# Patient Record
Sex: MALE | Race: WHITE | NOT HISPANIC OR LATINO | Employment: FULL TIME | URBAN - METROPOLITAN AREA
[De-identification: names, ages, dates, MRNs, and addresses within clinical notes are randomized per-mention and may not be internally consistent; named-entity substitution may affect disease eponyms.]

---

## 2023-03-13 DIAGNOSIS — I10 ESSENTIAL (PRIMARY) HYPERTENSION: ICD-10-CM

## 2023-03-13 RX ORDER — TRIAMTERENE AND HYDROCHLOROTHIAZIDE 75; 50 MG/1; MG/1
TABLET ORAL
Qty: 90 TABLET | Refills: 0 | Status: SHIPPED | OUTPATIENT
Start: 2023-03-13 | End: 2023-06-18

## 2023-03-16 DIAGNOSIS — I10 ESSENTIAL (PRIMARY) HYPERTENSION: Primary | ICD-10-CM

## 2023-03-16 RX ORDER — AMLODIPINE BESYLATE 10 MG/1
TABLET ORAL
Qty: 90 TABLET | Refills: 0 | Status: SHIPPED | OUTPATIENT
Start: 2023-03-16 | End: 2023-06-18

## 2023-03-31 DIAGNOSIS — E78.00 PURE HYPERCHOLESTEROLEMIA, UNSPECIFIED: ICD-10-CM

## 2023-03-31 RX ORDER — ROSUVASTATIN CALCIUM 40 MG/1
TABLET, COATED ORAL
Qty: 90 TABLET | Refills: 0 | Status: SHIPPED | OUTPATIENT
Start: 2023-03-31 | End: 2023-06-22 | Stop reason: SDUPTHER

## 2023-06-17 DIAGNOSIS — I10 ESSENTIAL (PRIMARY) HYPERTENSION: ICD-10-CM

## 2023-06-18 RX ORDER — AMLODIPINE BESYLATE 10 MG/1
TABLET ORAL
Qty: 90 TABLET | Refills: 0 | Status: SHIPPED | OUTPATIENT
Start: 2023-06-18 | End: 2023-09-17

## 2023-06-18 RX ORDER — TRIAMTERENE AND HYDROCHLOROTHIAZIDE 75; 50 MG/1; MG/1
TABLET ORAL
Qty: 90 TABLET | Refills: 0 | Status: SHIPPED | OUTPATIENT
Start: 2023-06-18 | End: 2023-09-17

## 2023-06-22 DIAGNOSIS — E78.00 PURE HYPERCHOLESTEROLEMIA, UNSPECIFIED: ICD-10-CM

## 2023-06-22 RX ORDER — ROSUVASTATIN CALCIUM 40 MG/1
TABLET, COATED ORAL
Qty: 90 TABLET | Refills: 0 | Status: SHIPPED | OUTPATIENT
Start: 2023-06-22 | End: 2023-11-03 | Stop reason: SDUPTHER

## 2023-09-17 DIAGNOSIS — I10 ESSENTIAL (PRIMARY) HYPERTENSION: ICD-10-CM

## 2023-09-17 RX ORDER — AMLODIPINE BESYLATE 10 MG/1
TABLET ORAL
Qty: 30 TABLET | Refills: 0 | Status: SHIPPED | OUTPATIENT
Start: 2023-09-17 | End: 2023-11-03 | Stop reason: SDUPTHER

## 2023-09-17 RX ORDER — TRIAMTERENE AND HYDROCHLOROTHIAZIDE 75; 50 MG/1; MG/1
TABLET ORAL
Qty: 30 TABLET | Refills: 0 | Status: SHIPPED | OUTPATIENT
Start: 2023-09-17 | End: 2023-11-03 | Stop reason: ALTCHOICE

## 2023-11-02 PROBLEM — M51.36 DDD (DEGENERATIVE DISC DISEASE), LUMBAR: Status: ACTIVE | Noted: 2023-11-02

## 2023-11-02 PROBLEM — R93.1 ELEVATED CORONARY ARTERY CALCIUM SCORE: Status: ACTIVE | Noted: 2023-11-02

## 2023-11-02 PROBLEM — E66.9 OBESE: Status: ACTIVE | Noted: 2023-11-02

## 2023-11-02 PROBLEM — G62.9 NEUROPATHY: Status: ACTIVE | Noted: 2023-11-02

## 2023-11-02 PROBLEM — R06.2 WHEEZING: Status: ACTIVE | Noted: 2023-11-02

## 2023-11-02 PROBLEM — I10 HYPERTENSION: Status: ACTIVE | Noted: 2023-11-02

## 2023-11-02 PROBLEM — J11.1 INFLUENZA: Status: ACTIVE | Noted: 2023-11-02

## 2023-11-02 PROBLEM — E55.9 VITAMIN D DEFICIENCY: Status: ACTIVE | Noted: 2023-11-02

## 2023-11-02 PROBLEM — I71.9 ANEURYSM, AORTIC (CMS-HCC): Status: ACTIVE | Noted: 2023-11-02

## 2023-11-02 PROBLEM — M51.27 HERNIATED NUCLEUS PULPOSUS, L5-S1, LEFT: Status: ACTIVE | Noted: 2023-11-02

## 2023-11-02 PROBLEM — Q23.1 BICUSPID AORTIC VALVE (HHS-HCC): Status: ACTIVE | Noted: 2023-11-02

## 2023-11-02 PROBLEM — I35.0 AORTIC STENOSIS, MILD: Status: ACTIVE | Noted: 2023-11-02

## 2023-11-02 PROBLEM — M51.369 DDD (DEGENERATIVE DISC DISEASE), LUMBAR: Status: ACTIVE | Noted: 2023-11-02

## 2023-11-02 PROBLEM — I31.39 PERICARDIAL EFFUSION (HHS-HCC): Status: ACTIVE | Noted: 2023-11-02

## 2023-11-02 PROBLEM — H66.90 ACUTE OTITIS MEDIA: Status: ACTIVE | Noted: 2023-11-02

## 2023-11-02 PROBLEM — R07.89 ATYPICAL CHEST PAIN: Status: ACTIVE | Noted: 2023-11-02

## 2023-11-02 PROBLEM — Q23.81 AORTIC REGURGITATION DUE TO BICUSPID AORTIC VALVE: Status: ACTIVE | Noted: 2023-11-02

## 2023-11-02 PROBLEM — E78.00 HYPERCHOLESTEROLEMIA: Status: ACTIVE | Noted: 2023-11-02

## 2023-11-02 PROBLEM — M54.16 ACUTE LEFT LUMBAR RADICULOPATHY: Status: ACTIVE | Noted: 2023-11-02

## 2023-11-02 PROBLEM — R73.9 HYPERGLYCEMIA: Status: ACTIVE | Noted: 2023-11-02

## 2023-11-02 PROBLEM — Q23.1 AORTIC REGURGITATION DUE TO BICUSPID AORTIC VALVE (HHS-HCC): Status: ACTIVE | Noted: 2023-11-02

## 2023-11-02 PROBLEM — J01.00 ACUTE MAXILLARY SINUSITIS: Status: ACTIVE | Noted: 2023-11-02

## 2023-11-02 PROBLEM — F41.9 ANXIETY: Status: ACTIVE | Noted: 2023-11-02

## 2023-11-02 PROBLEM — F41.1 GAD (GENERALIZED ANXIETY DISORDER): Status: ACTIVE | Noted: 2023-11-02

## 2023-11-02 PROBLEM — L30.9 DERMATITIS: Status: ACTIVE | Noted: 2023-11-02

## 2023-11-02 PROBLEM — F43.21 ADJUSTMENT DISORDER WITH DEPRESSED MOOD: Status: ACTIVE | Noted: 2023-11-02

## 2023-11-02 PROBLEM — T78.40XA ALLERGIC REACTION: Status: ACTIVE | Noted: 2023-11-02

## 2023-11-02 PROBLEM — E87.6 HYPOKALEMIA: Status: ACTIVE | Noted: 2023-11-02

## 2023-11-02 PROBLEM — E56.9 VITAMIN DEFICIENCY: Status: ACTIVE | Noted: 2023-11-02

## 2023-11-02 PROBLEM — R06.09 DYSPNEA ON EXERTION: Status: ACTIVE | Noted: 2023-11-02

## 2023-11-02 PROBLEM — M79.605 LEFT LEG PAIN: Status: ACTIVE | Noted: 2023-11-02

## 2023-11-02 PROBLEM — E79.0 ASYMPTOMATIC HYPERURICEMIA: Status: ACTIVE | Noted: 2023-11-02

## 2023-11-02 PROBLEM — I71.21 ASCENDING AORTIC ANEURYSM (CMS-HCC): Status: ACTIVE | Noted: 2023-11-02

## 2023-11-02 PROBLEM — R20.2 LEFT LEG PARESTHESIAS: Status: ACTIVE | Noted: 2023-11-02

## 2023-11-02 PROBLEM — Q23.81 BICUSPID AORTIC VALVE: Status: ACTIVE | Noted: 2023-11-02

## 2023-11-02 RX ORDER — ACETAMINOPHEN 500 MG
TABLET ORAL
COMMUNITY
Start: 2014-04-28 | End: 2023-11-03 | Stop reason: ALTCHOICE

## 2023-11-02 RX ORDER — ALLOPURINOL 300 MG/1
1 TABLET ORAL DAILY
COMMUNITY
End: 2023-11-03 | Stop reason: ALTCHOICE

## 2023-11-02 RX ORDER — BUSPIRONE HYDROCHLORIDE 7.5 MG/1
1 TABLET ORAL 2 TIMES DAILY
COMMUNITY
Start: 2023-03-24 | End: 2023-11-03 | Stop reason: ALTCHOICE

## 2023-11-02 RX ORDER — AMOXICILLIN 500 MG/1
1 CAPSULE ORAL
COMMUNITY
Start: 2023-02-27 | End: 2023-11-03 | Stop reason: ALTCHOICE

## 2023-11-02 RX ORDER — ASPIRIN 81 MG/1
TABLET ORAL
COMMUNITY
Start: 2022-04-11

## 2023-11-02 RX ORDER — NIRMATRELVIR AND RITONAVIR 300-100 MG
KIT ORAL
COMMUNITY
Start: 2022-05-20 | End: 2023-11-03 | Stop reason: ALTCHOICE

## 2023-11-02 RX ORDER — BETAMETHASONE DIPROPIONATE 0.5 MG/G
OINTMENT TOPICAL
COMMUNITY
Start: 2020-03-10

## 2023-11-02 RX ORDER — POTASSIUM CHLORIDE 20 MEQ/1
2 TABLET, EXTENDED RELEASE ORAL DAILY
COMMUNITY
Start: 2022-05-15 | End: 2023-11-03 | Stop reason: ALTCHOICE

## 2023-11-02 RX ORDER — IBUPROFEN 800 MG/1
TABLET ORAL
COMMUNITY
Start: 2020-06-21

## 2023-11-02 RX ORDER — GABAPENTIN 300 MG/1
CAPSULE ORAL
COMMUNITY
End: 2023-11-03 | Stop reason: ALTCHOICE

## 2023-11-02 RX ORDER — TRAMADOL HYDROCHLORIDE 50 MG/1
TABLET ORAL
COMMUNITY
End: 2023-11-03 | Stop reason: ALTCHOICE

## 2023-11-02 RX ORDER — BUSPIRONE HYDROCHLORIDE 5 MG/1
1 TABLET ORAL 2 TIMES DAILY
COMMUNITY
Start: 2023-03-24 | End: 2023-11-03 | Stop reason: ALTCHOICE

## 2023-11-02 RX ORDER — LOSARTAN POTASSIUM 50 MG/1
50 TABLET ORAL DAILY
COMMUNITY
End: 2023-11-03 | Stop reason: SDUPTHER

## 2023-11-03 ENCOUNTER — OFFICE VISIT (OUTPATIENT)
Dept: CARDIOLOGY | Facility: CLINIC | Age: 48
End: 2023-11-03
Payer: COMMERCIAL

## 2023-11-03 VITALS
HEART RATE: 79 BPM | SYSTOLIC BLOOD PRESSURE: 156 MMHG | DIASTOLIC BLOOD PRESSURE: 72 MMHG | HEIGHT: 67 IN | BODY MASS INDEX: 40.43 KG/M2 | WEIGHT: 257.56 LBS | OXYGEN SATURATION: 93 %

## 2023-11-03 DIAGNOSIS — Q23.1 AORTIC REGURGITATION DUE TO BICUSPID AORTIC VALVE (HHS-HCC): ICD-10-CM

## 2023-11-03 DIAGNOSIS — I35.0 AORTIC VALVE STENOSIS, ETIOLOGY OF CARDIAC VALVE DISEASE UNSPECIFIED: ICD-10-CM

## 2023-11-03 DIAGNOSIS — I25.10 CORONARY ARTERY CALCIFICATION: Primary | ICD-10-CM

## 2023-11-03 DIAGNOSIS — I71.21 ANEURYSM OF ASCENDING AORTA WITHOUT RUPTURE (CMS-HCC): ICD-10-CM

## 2023-11-03 DIAGNOSIS — E78.00 PURE HYPERCHOLESTEROLEMIA, UNSPECIFIED: ICD-10-CM

## 2023-11-03 DIAGNOSIS — I35.0 AORTIC STENOSIS, MILD: ICD-10-CM

## 2023-11-03 DIAGNOSIS — I10 ESSENTIAL (PRIMARY) HYPERTENSION: ICD-10-CM

## 2023-11-03 DIAGNOSIS — I10 HYPERTENSION, UNSPECIFIED TYPE: ICD-10-CM

## 2023-11-03 DIAGNOSIS — E78.00 HYPERCHOLESTEROLEMIA: ICD-10-CM

## 2023-11-03 DIAGNOSIS — R93.1 ELEVATED CORONARY ARTERY CALCIUM SCORE: ICD-10-CM

## 2023-11-03 DIAGNOSIS — I25.84 CORONARY ARTERY CALCIFICATION: Primary | ICD-10-CM

## 2023-11-03 PROCEDURE — 3077F SYST BP >= 140 MM HG: CPT | Performed by: INTERNAL MEDICINE

## 2023-11-03 PROCEDURE — 3078F DIAST BP <80 MM HG: CPT | Performed by: INTERNAL MEDICINE

## 2023-11-03 PROCEDURE — 99214 OFFICE O/P EST MOD 30 MIN: CPT | Performed by: INTERNAL MEDICINE

## 2023-11-03 RX ORDER — LOSARTAN POTASSIUM 100 MG/1
100 TABLET ORAL DAILY
Qty: 90 TABLET | Refills: 3 | Status: SHIPPED | OUTPATIENT
Start: 2023-11-03 | End: 2024-11-02

## 2023-11-03 RX ORDER — AMLODIPINE BESYLATE 10 MG/1
10 TABLET ORAL DAILY
Qty: 90 TABLET | Refills: 3 | Status: SHIPPED | OUTPATIENT
Start: 2023-11-03 | End: 2024-11-02

## 2023-11-03 RX ORDER — ROSUVASTATIN CALCIUM 40 MG/1
40 TABLET, COATED ORAL DAILY
Qty: 90 TABLET | Refills: 3 | Status: SHIPPED | OUTPATIENT
Start: 2023-11-03 | End: 2024-11-02

## 2023-11-03 ASSESSMENT — ENCOUNTER SYMPTOMS
DIARRHEA: 0
FEVER: 0
ALTERED MENTAL STATUS: 0
WHEEZING: 0
ABDOMINAL PAIN: 0
CHILLS: 0
HEMOPTYSIS: 0
HEMATURIA: 0
NAUSEA: 0
HEADACHES: 0
DYSURIA: 0
DEPRESSION: 0
BLOATING: 0
MEMORY LOSS: 0
FALLS: 0
VOMITING: 0
MYALGIAS: 0
COUGH: 0
CONSTIPATION: 0

## 2023-11-03 ASSESSMENT — PAIN SCALES - GENERAL: PAINLEVEL: 0-NO PAIN

## 2023-11-03 NOTE — PATIENT INSTRUCTIONS
Increase Losartan from 50 to 100mg 1x/day (can take 50mg x2 until run out)    Goal BP <130/80    If too high, let me know to add another med ([phone])

## 2023-11-03 NOTE — PROGRESS NOTES
Chief complaint:  Follow-up     HPI  49 yo WM w/ h/o pericardial effusion (during viral pericarditis) s/p window 2/16, ?bicuspid AV w/ mild AS and mod AI, AsAo aneurysm, CAC, HTN, HLD, DM now here for cardiology FU. No chest pain. No dyspnea. No palps. No LH/dizziness/syncope. No edema. No claudication. No cough.   He walks 30 minutes 3d/wk with no symptoms.  BP at home: 150s  ECG 7/16: SR (74), 1st deg AVB, ?AMI, lat ST-T changes  ECG 1/19: SR (63), 1st deg AVB, ?AMI, lat ST-T changes  ECG 4/22: SR (66), 1st deg AVB, ?AMI, lat ST-T changes  Echo 2/15: EF 55%, AoR 4.5cm, mod dil AsAo, mild-mod AI, large PE (near RA/RV collapse)  Echo 2/16: nl LV/RV, mod-large PE (mod RA collapse)  Echo 10/16: EF 65%, mod LVH, DD, nl RV, ?bicuspid AV, mild AS, mod AI, small PE  Echo 5/22: TDS, EF 65%, DD, AoR 4.7cm, AsAo 4.6cm, mild AVS, mod AI, trivial PE  CAC 4/22: 490 (LM 0, , Lcx 31, RCA 28), CM, small-mod PE, AsAo 4.8cm  CT chest 4/16: CM, peric eff, nl Ao, no aneurysm, nl PA  CT chest 4/17: mild CM mod peric eff (stable), AsAo 4.5cm, nl PA  CT ab 2/16: CM, large PE, no aneurysm     Review of Systems   Constitutional: Negative for chills, fever and malaise/fatigue.   HENT:  Negative for hearing loss.    Eyes:  Negative for visual disturbance.   Respiratory:  Negative for cough, hemoptysis and wheezing.    Skin:  Negative for rash.   Musculoskeletal:  Negative for falls and myalgias.   Gastrointestinal:  Negative for bloating, abdominal pain, constipation, diarrhea, dysphagia, nausea and vomiting.   Genitourinary:  Negative for dysuria and hematuria.   Neurological:  Negative for headaches.   Psychiatric/Behavioral:  Negative for altered mental status, depression and memory loss.         Social History     Tobacco Use    Smoking status: Never    Smokeless tobacco: Never   Substance Use Topics    Alcohol use: Not Currently        Family History   Problem Relation Name Age of Onset    Breast cancer Mother      Heart attack  Mother      Heart attack Father      Alcohol abuse Maternal Grandfather          Allergies   Allergen Reactions    Lipofen [Fenofibrate] Other        Current Outpatient Medications   Medication Instructions    amLODIPine (Norvasc) 10 mg tablet TAKE 1 TABLET BY MOUTH EVERY DAY    aspirin 81 mg EC tablet TAKE 1 TABLET Daily take 1 tablet daily with food    betamethasone dipropionate (Diprosone) 0.05 % ointment APPLY SPARINGLY TO AFFECTED AREA(S) ONCE DAILY    ibuprofen 800 mg tablet TAKE 1 TABLET EVERY 8 HOURS WITH FOOD AS NEEDED.    losartan (COZAAR) 50 mg, oral, Daily, as directed    rosuvastatin (Crestor) 40 mg tablet TAKE 1 TABLET BY MOUTH EVERY DAY    triamterene-hydrochlorothiazid (Maxzide) 75-50 mg tablet TAKE 1 TABLET BY MOUTH EVERY DAY        Vitals:    11/03/23 1505   Pulse: 88   SpO2: 93%        Physical Exam  Constitutional:       Appearance: Normal appearance.   HENT:      Head: Normocephalic and atraumatic.      Nose: Nose normal.   Cardiovascular:      Rate and Rhythm: Normal rate and regular rhythm.      Heart sounds: Murmur heard.      Systolic murmur is present with a grade of 1/6.      Diastolic murmur is present with a grade of 1/4.   Pulmonary:      Effort: Pulmonary effort is normal.      Breath sounds: Normal breath sounds.   Abdominal:      Palpations: Abdomen is soft.      Tenderness: There is no abdominal tenderness.   Musculoskeletal:      Right lower leg: No edema.      Left lower leg: No edema.   Skin:     General: Skin is warm and dry.   Neurological:      General: No focal deficit present.      Mental Status: He is alert.   Psychiatric:         Mood and Affect: Mood normal.         Judgment: Judgment normal.       Labs  11/21 Cr 0.88, K 3.0, LFT nl, LDL 54, HDL 32, , Chol 121, HGB 15.8, , hgba1c 7.7, TSH 2.03     Assessment/Plan   47 yo WM w/ h/o pericardial effusion (during viral pericarditis) s/p window 2/16, ?bicuspid AV w/ mild AS and mod AI, AsAo aneurysm, CAC, HTN,  HLD, DM. Doing well. No sig cardiac symptoms. Due to AsAo aneurysm (and BAV/AVS/AI), check echo (likely will need annual or CT).   BP needs better control (SBP may be higher with AI).  -increase Losartan from 50 to 100 qd   -continue Amlodipine 10 every day  -plan resume Triam-hydrochlorothiazide if needed for HTN  -continue Rosvua 40 every day  -recommend updated labs if not recently done  -f/u 1 year (earlier if needed)    Joe Bailey MD

## 2023-11-14 ENCOUNTER — APPOINTMENT (OUTPATIENT)
Dept: CARDIOLOGY | Facility: CLINIC | Age: 48
End: 2023-11-14
Payer: COMMERCIAL

## 2023-11-27 ENCOUNTER — HOSPITAL ENCOUNTER (OUTPATIENT)
Dept: CARDIOLOGY | Facility: CLINIC | Age: 48
Discharge: HOME | End: 2023-11-27
Payer: COMMERCIAL

## 2023-11-27 DIAGNOSIS — I35.1 NONRHEUMATIC AORTIC (VALVE) INSUFFICIENCY: ICD-10-CM

## 2023-11-27 DIAGNOSIS — I71.20 THORACIC AORTIC ANEURYSM, WITHOUT RUPTURE, UNSPECIFIED (CMS-HCC): ICD-10-CM

## 2023-11-27 DIAGNOSIS — Q23.1 AORTIC REGURGITATION DUE TO BICUSPID AORTIC VALVE (HHS-HCC): ICD-10-CM

## 2023-11-27 DIAGNOSIS — I71.21 ANEURYSM OF ASCENDING AORTA WITHOUT RUPTURE (CMS-HCC): ICD-10-CM

## 2023-11-27 DIAGNOSIS — I35.0 AORTIC VALVE STENOSIS, ETIOLOGY OF CARDIAC VALVE DISEASE UNSPECIFIED: ICD-10-CM

## 2023-11-27 PROCEDURE — 93321 DOPPLER ECHO F-UP/LMTD STD: CPT

## 2023-11-27 PROCEDURE — 93321 DOPPLER ECHO F-UP/LMTD STD: CPT | Performed by: INTERNAL MEDICINE

## 2023-11-27 PROCEDURE — 93325 DOPPLER ECHO COLOR FLOW MAPG: CPT | Performed by: INTERNAL MEDICINE

## 2023-11-27 PROCEDURE — 93308 TTE F-UP OR LMTD: CPT | Performed by: INTERNAL MEDICINE

## 2023-11-29 LAB
AORTIC VALVE MEAN GRADIENT: 21
AORTIC VALVE PEAK VELOCITY: 3
AV PEAK GRADIENT: 36
AVA (PEAK VEL): 2.23
AVA (VTI): 2.26
EJECTION FRACTION APICAL 4 CHAMBER: 59.9
EJECTION FRACTION: 59
LEFT VENTRICLE INTERNAL DIMENSION DIASTOLE: 6 (ref 3.5–6)
LEFT VENTRICULAR OUTFLOW TRACT DIAMETER: 2.5
RIGHT VENTRICLE PEAK SYSTOLIC PRESSURE: 28.2

## 2024-02-12 DIAGNOSIS — I10 HYPERTENSION, UNSPECIFIED TYPE: Primary | ICD-10-CM

## 2024-02-12 RX ORDER — TRIAMTERENE AND HYDROCHLOROTHIAZIDE 75; 50 MG/1; MG/1
1 TABLET ORAL DAILY
Qty: 90 TABLET | Refills: 1 | Status: SHIPPED | OUTPATIENT
Start: 2024-02-12 | End: 2024-04-10 | Stop reason: SDUPTHER

## 2024-04-03 DIAGNOSIS — I10 HYPERTENSION, UNSPECIFIED TYPE: Primary | ICD-10-CM

## 2024-04-03 RX ORDER — CARVEDILOL 3.12 MG/1
3.12 TABLET ORAL
Qty: 180 TABLET | Refills: 3 | Status: SHIPPED | OUTPATIENT
Start: 2024-04-03 | End: 2024-04-10 | Stop reason: SDUPTHER

## 2024-04-10 RX ORDER — TRIAMTERENE AND HYDROCHLOROTHIAZIDE 75; 50 MG/1; MG/1
1 TABLET ORAL DAILY
Qty: 90 TABLET | Refills: 1 | Status: SHIPPED | OUTPATIENT
Start: 2024-04-10 | End: 2024-04-22 | Stop reason: SDUPTHER

## 2024-04-10 RX ORDER — CARVEDILOL 6.25 MG/1
6.25 TABLET ORAL
Qty: 180 TABLET | Refills: 1 | Status: SHIPPED | OUTPATIENT
Start: 2024-04-10 | End: 2025-04-10

## 2024-04-22 DIAGNOSIS — I10 HYPERTENSION, UNSPECIFIED TYPE: ICD-10-CM

## 2024-04-22 RX ORDER — TRIAMTERENE AND HYDROCHLOROTHIAZIDE 75; 50 MG/1; MG/1
1 TABLET ORAL DAILY
Qty: 90 TABLET | Refills: 1 | Status: SHIPPED | OUTPATIENT
Start: 2024-04-22 | End: 2025-04-22

## 2024-04-23 ENCOUNTER — OFFICE VISIT (OUTPATIENT)
Dept: PRIMARY CARE | Facility: CLINIC | Age: 49
End: 2024-04-23
Payer: COMMERCIAL

## 2024-04-23 VITALS
OXYGEN SATURATION: 96 % | SYSTOLIC BLOOD PRESSURE: 132 MMHG | BODY MASS INDEX: 40.74 KG/M2 | DIASTOLIC BLOOD PRESSURE: 56 MMHG | WEIGHT: 259.6 LBS | RESPIRATION RATE: 16 BRPM | HEIGHT: 67 IN | HEART RATE: 75 BPM

## 2024-04-23 DIAGNOSIS — E66.1 CLASS 3 DRUG-INDUCED OBESITY WITH SERIOUS COMORBIDITY AND BODY MASS INDEX (BMI) OF 40.0 TO 44.9 IN ADULT (MULTI): ICD-10-CM

## 2024-04-23 DIAGNOSIS — I10 HYPERTENSION, UNSPECIFIED TYPE: Primary | ICD-10-CM

## 2024-04-23 DIAGNOSIS — Q23.1 AORTIC REGURGITATION DUE TO BICUSPID AORTIC VALVE (HHS-HCC): ICD-10-CM

## 2024-04-23 DIAGNOSIS — E78.00 HYPERCHOLESTEROLEMIA: ICD-10-CM

## 2024-04-23 DIAGNOSIS — Z12.11 COLON CANCER SCREENING: ICD-10-CM

## 2024-04-23 PROCEDURE — 3078F DIAST BP <80 MM HG: CPT | Performed by: STUDENT IN AN ORGANIZED HEALTH CARE EDUCATION/TRAINING PROGRAM

## 2024-04-23 PROCEDURE — 3075F SYST BP GE 130 - 139MM HG: CPT | Performed by: STUDENT IN AN ORGANIZED HEALTH CARE EDUCATION/TRAINING PROGRAM

## 2024-04-23 PROCEDURE — 99214 OFFICE O/P EST MOD 30 MIN: CPT | Performed by: STUDENT IN AN ORGANIZED HEALTH CARE EDUCATION/TRAINING PROGRAM

## 2024-04-23 PROCEDURE — 3008F BODY MASS INDEX DOCD: CPT | Performed by: STUDENT IN AN ORGANIZED HEALTH CARE EDUCATION/TRAINING PROGRAM

## 2024-04-23 ASSESSMENT — PATIENT HEALTH QUESTIONNAIRE - PHQ9
1. LITTLE INTEREST OR PLEASURE IN DOING THINGS: NOT AT ALL
2. FEELING DOWN, DEPRESSED OR HOPELESS: NOT AT ALL
SUM OF ALL RESPONSES TO PHQ9 QUESTIONS 1 AND 2: 0

## 2024-04-23 NOTE — PROGRESS NOTES
History Of Present Illness  Michael Mulligan is a 48 y.o. male presents to the office for establishment of care.     HTN  Cardiology following   Continue rosuvastatin 40 mg daily   Recently increased triamterene-hydrochlorothiazide 75-50mg daily   Continue losartan 100 mg daily   Cardiac CT: 489.7   Echo 11/23    Anxiety   Improved and now off of medication  Buspar 7.5mg BID      Thoracic aorta   Aneurysmal dilatation ascending thoracic aorta up to 4.8 cm  similar to prior.    Past Medical History  He has a past medical history of Generalized anxiety disorder, Personal history of other mental and behavioral disorders, and Pure hypercholesterolemia, unspecified (12/14/2022).  s/p window 2/16, ?bicuspid AV w/ mild AS and mod AI, AsAo aneurysm, CAC, HTN, HLD, DM now here for cardiology FU     Surgical History  He has a past surgical history that includes Other surgical history (03/30/2016).     Social History  He reports that he has never smoked. He has never used smokeless tobacco. He reports that he does not currently use alcohol. He reports that he does not use drugs.    Family History  Family History   Problem Relation Name Age of Onset    Breast cancer Mother      Heart attack Mother      Heart attack Father      Alcohol abuse Maternal Grandfather          Allergies  Cat's claw and Lipofen [fenofibrate]       Physical Exam  Vitals reviewed.   Constitutional:       General: He is not in acute distress.     Appearance: He is obese. He is not ill-appearing.   HENT:      Right Ear: Tympanic membrane and ear canal normal.      Left Ear: Tympanic membrane and ear canal normal.      Mouth/Throat:      Mouth: Mucous membranes are moist.      Pharynx: Oropharynx is clear. No oropharyngeal exudate or posterior oropharyngeal erythema.   Eyes:      Extraocular Movements: Extraocular movements intact.      Conjunctiva/sclera: Conjunctivae normal.      Pupils: Pupils are equal, round, and reactive to light.   Neck:       Vascular: No carotid bruit.   Cardiovascular:      Rate and Rhythm: Normal rate and regular rhythm.      Heart sounds: No murmur heard.     No gallop.   Pulmonary:      Effort: Pulmonary effort is normal.      Breath sounds: Normal breath sounds. No wheezing, rhonchi or rales.   Abdominal:      General: Abdomen is flat. Bowel sounds are normal.      Palpations: Abdomen is soft.      Tenderness: There is no abdominal tenderness.   Musculoskeletal:      Cervical back: Neck supple.      Left lower leg: No edema.   Skin:     General: Skin is warm and dry.      Findings: No rash.   Neurological:      General: No focal deficit present.      Mental Status: He is alert and oriented to person, place, and time.      Gait: Gait normal.   Psychiatric:         Mood and Affect: Mood normal.         Behavior: Behavior normal.        Last Recorded Vitals  /56   Pulse 75   Resp 16   Wt 118 kg (259 lb 9.6 oz)   SpO2 96%     Relevant Results    Current Outpatient Medications:     amLODIPine (Norvasc) 10 mg tablet, Take 1 tablet (10 mg) by mouth once daily., Disp: 90 tablet, Rfl: 3    aspirin 81 mg EC tablet, TAKE 1 TABLET Daily take 1 tablet daily with food, Disp: , Rfl:     betamethasone dipropionate (Diprosone) 0.05 % ointment, APPLY SPARINGLY TO AFFECTED AREA(S) ONCE DAILY, Disp: , Rfl:     carvedilol (Coreg) 6.25 mg tablet, Take 1 tablet (6.25 mg) by mouth 2 times a day with meals., Disp: 180 tablet, Rfl: 1    ibuprofen 800 mg tablet, TAKE 1 TABLET EVERY 8 HOURS WITH FOOD AS NEEDED., Disp: , Rfl:     losartan (Cozaar) 100 mg tablet, Take 1 tablet (100 mg) by mouth once daily. as directed, Disp: 90 tablet, Rfl: 3    rosuvastatin (Crestor) 40 mg tablet, Take 1 tablet (40 mg) by mouth once daily., Disp: 90 tablet, Rfl: 3    triamterene-hydrochlorothiazid (Maxzide) 75-50 mg tablet, Take 1 tablet by mouth once daily., Disp: 90 tablet, Rfl: 1          Assessment/Plan   Diagnoses and all orders for this visit:  Hypertension,  unspecified type  -     CBC and Auto Differential; Future  -     Comprehensive metabolic panel; Future  -     Lipid Panel; Future  -     Tsh With Reflex To Free T4 If Abnormal; Future  -     Vascular US renal artery duplex complete; Future  -     Albumin , Urine Random; Future  Hypercholesterolemia  -     Lipid Panel; Future  Class 3 drug-induced obesity with serious comorbidity and body mass index (BMI) of 40.0 to 44.9 in adult (Multi)  -     Hemoglobin A1C; Future  -     Vitamin D 25-Hydroxy,Total (for eval of Vitamin D levels); Future  Colon cancer screening  -     Cologuard® colon cancer screening; Future    HTN  Continue losartan 100mg daily   Continue carvedilol 6.25mg daily   Continue amlodipine 10mg daily   Continue trimterene-hydrochlorothiazide 75-50mg daily   Physical exam was stable. Discussed maintaining diets such as DASH to help maintain normal Blood pressure. Encouraged regular exercise for a total of 120 min weekly. We will fu labs in 1-3 days. For now there will be no change in medical management. All questions and concerns were addressed. Pt will follow up in 4-6 months or sooner if needed.     DSL  Continue rosuvastatin 40mg daily   Lipid panel ordered     Heart Murmur   Aortic regurgitation due to bicuspid valve  Cardiology following     Anxiety   Improved and now off of medication  Buspar 7.5mg BID      Thoracic aorta   Stable   Aneurysmal dilatation ascending thoracic aorta up to 4.8 cm  similar to prior.  Cardiology following     Health Maintenance  Colon cancer screening cologaurd ordered   Health Maintenance   Topic Date Due    Yearly Adult Physical  Never done    HIV Screening  Never done    Colorectal Cancer Screening  Never done    Pneumococcal Vaccine: Pediatrics (0 to 5 Years) and At-Risk Patients (6 to 64 Years) (1 of 2 - PCV) Never done    Hepatitis C Screening  Never done    Hepatitis B Vaccines (1 of 3 - 19+ 3-dose series) Never done    DTaP/Tdap/Td Vaccines (1 - Tdap) Never done     MMR Vaccines (1 of 1 - Standard series) Never done    COVID-19 Vaccine (4 - 2023-24 season) 09/01/2023    Zoster Vaccines (1 of 2) 08/28/2024    Influenza Vaccine (Season Ended) 09/01/2024    Diabetes Screening  11/22/2024    Lipid Panel  11/22/2026    HIB Vaccines  Aged Out    IPV Vaccines  Aged Out    Hepatitis A Vaccines  Aged Out    Meningococcal Vaccine  Aged Out    Rotavirus Vaccines  Aged Out    HPV Vaccines  Aged Out            Amna White, DO

## 2024-08-07 ENCOUNTER — OFFICE VISIT (OUTPATIENT)
Dept: PAIN MEDICINE | Facility: CLINIC | Age: 49
End: 2024-08-07
Payer: COMMERCIAL

## 2024-08-07 DIAGNOSIS — M51.36 DDD (DEGENERATIVE DISC DISEASE), LUMBAR: Primary | ICD-10-CM

## 2024-08-07 DIAGNOSIS — M54.16 RIGHT LUMBAR RADICULITIS: ICD-10-CM

## 2024-08-07 PROCEDURE — 99214 OFFICE O/P EST MOD 30 MIN: CPT | Performed by: PHYSICAL MEDICINE & REHABILITATION

## 2024-08-07 RX ORDER — CYCLOBENZAPRINE HCL 5 MG
5 TABLET ORAL 2 TIMES DAILY PRN
Qty: 60 TABLET | Refills: 2 | Status: SHIPPED | OUTPATIENT
Start: 2024-08-07 | End: 2024-09-06

## 2024-08-07 NOTE — PROGRESS NOTES
Chief complaint  Back and lower limb pain  on the R     History  Michael Mulligan is back for pain management office visit  Pain after lifting while moving  The pain is interfering with activities of daily living, quality of life and quality of sleep. It is limiting the functions and everything takes longer to complete because of the slowing related to the pain. Movements are cautious to avoid aggravation of the symptoms.   Tried PT and Hep but not much relief   Pain started about 4 weeks and not remittant  The pain in the back is deep achy worse in the mid back area.  This is associated with tight muscle bands.  This limits the range of motion of the lumbar spine mainly in forward flexion.  The pain in the back is radiating around the side on the lateral aspect of the right thigh going down toward the lateral aspect of the right leg into the lateral malleolus toward the lateral aspect of the foot towards the big toe.    This pain down to the right lower limb is more of a burning tingling sensation.  The pain in the right lower limb worsens with bending forward or with any lifting, it improves with laying on the side and resting.  This is similar to the associated pain in the middle to lower back.  Denied any bowel or bladder incontinence.  With the worst pain there is tendency to catch the toe especially on carpeted area.  This occurs mostly when tired and toward the end of the day.    The skin is intact with no breakdown.  No vesicles.       Pain level at rest  is 4/10 , with the aggravation ,  pain level 6/10.          Review of Systems :  Denied any fever or chills. No weight loss and no night sweats. No cough or sputum production. No diarrhea   The constipation has been responding to fibers and over the counter medications.     No bladder and bowel incontinence and no other changes in bladder and bowel. No skin changes.  Reports tiredness and fatigability only if the pain is not controlled.             Physical examination  Awake, alert and oriented for time place and persons   declined Chaperone for the visit and was adequately  draped for the exam.    Examination of the lumbar spine showed tight muscle bands in the mid and lower back area, this is more pronounced on the right compared to the left.  Additionally, this is inducing mild reversal of the lumbar lordosis with functional scoliosis with right-sided concavity.  This scoliosis corrected with  bending of the lumbar spine.     Straight leg raising increased the pain in the back and down the lateral aspect of the right knee onto the lateral leg to the lateral malleolus and foot.     There is decreased sensory to light touch on the lateral aspect of the thigh and around the right knee going down to the lateral aspect of the leg to the right lateral malleolus into the dorsum of the foot..    Deep tendon reflexes is present for the patellar tendons bilaterally.  Achilles reflexes are present bilaterally and symmetric.    Medial Hamstrings reflex is decreased on the right compared to the left side.  Plantar cutaneous are downgoing.  Ankle dorsiflexion is 5/5 bilaterally.  Plantar flexion of the ankles are 5/5 bilaterally.  Big toe extension is 4/5 on the right compared to 5/5 on the left side.    Negative Tinel's sign over the right peroneal nerve at the fibular neck.  Fely sign for axial loading and global rotation are negative.  No aberrant pain behavior.           Diagnosis  Problem List Items Addressed This Visit       Right lumbar radiculitis    Relevant Medications    cyclobenzaprine (Flexeril) 5 mg tablet    Other Relevant Orders    FL pain management    Transforaminal    DDD (degenerative disc disease), lumbar - Primary    Relevant Medications    cyclobenzaprine (Flexeril) 5 mg tablet    Other Relevant Orders    FL pain management    Transforaminal        Plan  Reviewed the pain generators.  Went over the types of pain with neuropathic and  nociceptive and different pathologies and therapeutic modalities. Discussed the mechanism of action of interventions from acupuncture, physical therapy , regular exercises, injections, botox, spinal cord stimulation, and role of surgery     Went over pathology of the intervertebral disc displacement and the anatomical relation to the Nerve roots and relation to the radicular symptoms. Went over treatment modalities with conservative treatment including acupuncture   and epidural steroid injection with fluoroscopy guidance and last resort of surgery    Since he is having limitation with pain and tried conservative treatment, will consider LOKESH.  Risks not limited to infection, sepsis, abscess, bleeding , nerve injury, paralysis, and death...  Rl 5 Transforaminal epidural steroid injection      Continue with HEP        Discussed about NSAIDS and I explained about the opioids sparing effect to allow keeping the opioids dose at minimal effective dose.   I went over the potential side effects of the NSAIDS on the gastrointestinal, renal and cardiovascular systems.      I detailed the side effects from the acetaminophen in the medication and made aware of those. I also explained about the cumulative effects on the organs and mainly the liver.        Follow-up after the LOKESH  or earlier if needed     The level of clinical decision making in this office visit,  is high, given the high risks of complications with the morbidity and mortality due to the fact that acute and chronic pain may pose a threat to life and bodily function, if under treated, poorly treated, or with failure to maintain adequate treatment and timely medical follow up. Additionally over treatment has its own set of complications including overdosing on the pain medications and also the habit forming potentials with the use of the medications used to treat chronic painful conditions including therapeutic classes classified as dangerous medications. Given the  serious and fluctuating nature of pain level and instensity with extensive consideration for whenever pain changes, there is always the risk of prolonged functional impairment requiring close patient monitoring with regular assessments and reassessments and high level medical decision making at every office visit. The amount and complexity of data reviewed is high given the patient clinical presentation, labs,  data, radiology reports, and other tests as discussed during office visits. Pertinent data whether positive or negative were taken in consideration in the process of making this high level medical decision.

## 2024-09-06 ENCOUNTER — LAB (OUTPATIENT)
Dept: LAB | Facility: LAB | Age: 49
End: 2024-09-06
Payer: COMMERCIAL

## 2024-09-06 DIAGNOSIS — I10 HYPERTENSION, UNSPECIFIED TYPE: ICD-10-CM

## 2024-09-06 DIAGNOSIS — E66.1 CLASS 3 DRUG-INDUCED OBESITY WITH SERIOUS COMORBIDITY AND BODY MASS INDEX (BMI) OF 40.0 TO 44.9 IN ADULT (MULTI): ICD-10-CM

## 2024-09-06 DIAGNOSIS — E78.00 HYPERCHOLESTEROLEMIA: ICD-10-CM

## 2024-09-06 LAB
25(OH)D3 SERPL-MCNC: 22 NG/ML (ref 30–100)
ALBUMIN SERPL BCP-MCNC: 4.3 G/DL (ref 3.4–5)
ALP SERPL-CCNC: 60 U/L (ref 33–120)
ALT SERPL W P-5'-P-CCNC: 31 U/L (ref 10–52)
ANION GAP SERPL CALC-SCNC: 13 MMOL/L (ref 10–20)
AST SERPL W P-5'-P-CCNC: 21 U/L (ref 9–39)
BASOPHILS # BLD AUTO: 0.04 X10*3/UL (ref 0–0.1)
BASOPHILS NFR BLD AUTO: 0.7 %
BILIRUB SERPL-MCNC: 0.6 MG/DL (ref 0–1.2)
BUN SERPL-MCNC: 18 MG/DL (ref 6–23)
CALCIUM SERPL-MCNC: 9.3 MG/DL (ref 8.6–10.3)
CHLORIDE SERPL-SCNC: 98 MMOL/L (ref 98–107)
CHOLEST SERPL-MCNC: 126 MG/DL (ref 0–199)
CHOLESTEROL/HDL RATIO: 3.7
CO2 SERPL-SCNC: 30 MMOL/L (ref 21–32)
CREAT SERPL-MCNC: 0.93 MG/DL (ref 0.5–1.3)
CREAT UR-MCNC: 154 MG/DL (ref 20–370)
EGFRCR SERPLBLD CKD-EPI 2021: >90 ML/MIN/1.73M*2
EOSINOPHIL # BLD AUTO: 0.06 X10*3/UL (ref 0–0.7)
EOSINOPHIL NFR BLD AUTO: 1 %
ERYTHROCYTE [DISTWIDTH] IN BLOOD BY AUTOMATED COUNT: 13.1 % (ref 11.5–14.5)
EST. AVERAGE GLUCOSE BLD GHB EST-MCNC: 232 MG/DL
GLUCOSE SERPL-MCNC: 251 MG/DL (ref 74–99)
HBA1C MFR BLD: 9.7 %
HCT VFR BLD AUTO: 41.8 % (ref 41–52)
HDLC SERPL-MCNC: 33.8 MG/DL
HGB BLD-MCNC: 14.8 G/DL (ref 13.5–17.5)
IMM GRANULOCYTES # BLD AUTO: 0.02 X10*3/UL (ref 0–0.7)
IMM GRANULOCYTES NFR BLD AUTO: 0.3 % (ref 0–0.9)
LDLC SERPL CALC-MCNC: 42 MG/DL
LYMPHOCYTES # BLD AUTO: 1.69 X10*3/UL (ref 1.2–4.8)
LYMPHOCYTES NFR BLD AUTO: 29.5 %
MCH RBC QN AUTO: 29 PG (ref 26–34)
MCHC RBC AUTO-ENTMCNC: 35.4 G/DL (ref 32–36)
MCV RBC AUTO: 82 FL (ref 80–100)
MICROALBUMIN UR-MCNC: 20.9 MG/L
MICROALBUMIN/CREAT UR: 13.6 UG/MG CREAT
MONOCYTES # BLD AUTO: 0.67 X10*3/UL (ref 0.1–1)
MONOCYTES NFR BLD AUTO: 11.7 %
NEUTROPHILS # BLD AUTO: 3.24 X10*3/UL (ref 1.2–7.7)
NEUTROPHILS NFR BLD AUTO: 56.8 %
NON HDL CHOLESTEROL: 92 MG/DL (ref 0–149)
NRBC BLD-RTO: 0 /100 WBCS (ref 0–0)
PLATELET # BLD AUTO: 215 X10*3/UL (ref 150–450)
POTASSIUM SERPL-SCNC: 3.1 MMOL/L (ref 3.5–5.3)
PROT SERPL-MCNC: 7.2 G/DL (ref 6.4–8.2)
RBC # BLD AUTO: 5.11 X10*6/UL (ref 4.5–5.9)
SODIUM SERPL-SCNC: 138 MMOL/L (ref 136–145)
TRIGL SERPL-MCNC: 253 MG/DL (ref 0–149)
TSH SERPL-ACNC: 1.63 MIU/L (ref 0.44–3.98)
VLDL: 51 MG/DL (ref 0–40)
WBC # BLD AUTO: 5.7 X10*3/UL (ref 4.4–11.3)

## 2024-09-06 PROCEDURE — 82043 UR ALBUMIN QUANTITATIVE: CPT

## 2024-09-06 PROCEDURE — 36415 COLL VENOUS BLD VENIPUNCTURE: CPT

## 2024-09-06 PROCEDURE — 82306 VITAMIN D 25 HYDROXY: CPT

## 2024-09-06 PROCEDURE — 83036 HEMOGLOBIN GLYCOSYLATED A1C: CPT

## 2024-09-06 PROCEDURE — 82570 ASSAY OF URINE CREATININE: CPT

## 2024-09-09 ENCOUNTER — APPOINTMENT (OUTPATIENT)
Dept: PRIMARY CARE | Facility: CLINIC | Age: 49
End: 2024-09-09
Payer: COMMERCIAL

## 2024-09-09 VITALS
OXYGEN SATURATION: 95 % | HEIGHT: 67 IN | WEIGHT: 259.4 LBS | HEART RATE: 84 BPM | BODY MASS INDEX: 40.71 KG/M2 | DIASTOLIC BLOOD PRESSURE: 64 MMHG | SYSTOLIC BLOOD PRESSURE: 128 MMHG

## 2024-09-09 DIAGNOSIS — E11.9 TYPE 2 DIABETES MELLITUS WITHOUT COMPLICATION, WITHOUT LONG-TERM CURRENT USE OF INSULIN (MULTI): Primary | ICD-10-CM

## 2024-09-09 DIAGNOSIS — E55.9 VITAMIN D DEFICIENCY: ICD-10-CM

## 2024-09-09 DIAGNOSIS — E78.00 HYPERCHOLESTEROLEMIA: ICD-10-CM

## 2024-09-09 DIAGNOSIS — I10 HYPERTENSION, UNSPECIFIED TYPE: ICD-10-CM

## 2024-09-09 DIAGNOSIS — E11.9 TYPE 2 DIABETES MELLITUS WITHOUT COMPLICATION, WITHOUT LONG-TERM CURRENT USE OF INSULIN (MULTI): ICD-10-CM

## 2024-09-09 PROBLEM — R06.2 WHEEZING: Status: RESOLVED | Noted: 2023-11-02 | Resolved: 2024-09-09

## 2024-09-09 PROBLEM — R06.09 DYSPNEA ON EXERTION: Status: RESOLVED | Noted: 2023-11-02 | Resolved: 2024-09-09

## 2024-09-09 PROBLEM — R07.89 ATYPICAL CHEST PAIN: Status: RESOLVED | Noted: 2023-11-02 | Resolved: 2024-09-09

## 2024-09-09 PROBLEM — M79.605 LEFT LEG PAIN: Status: RESOLVED | Noted: 2023-11-02 | Resolved: 2024-09-09

## 2024-09-09 PROBLEM — J11.1 INFLUENZA: Status: RESOLVED | Noted: 2023-11-02 | Resolved: 2024-09-09

## 2024-09-09 PROBLEM — H66.90 ACUTE OTITIS MEDIA: Status: RESOLVED | Noted: 2023-11-02 | Resolved: 2024-09-09

## 2024-09-09 PROBLEM — I31.39 PERICARDIAL EFFUSION (HHS-HCC): Status: RESOLVED | Noted: 2023-11-02 | Resolved: 2024-09-09

## 2024-09-09 PROBLEM — R73.9 HYPERGLYCEMIA: Status: RESOLVED | Noted: 2023-11-02 | Resolved: 2024-09-09

## 2024-09-09 PROBLEM — E87.6 HYPOKALEMIA: Status: RESOLVED | Noted: 2023-11-02 | Resolved: 2024-09-09

## 2024-09-09 PROBLEM — J01.00 ACUTE MAXILLARY SINUSITIS: Status: RESOLVED | Noted: 2023-11-02 | Resolved: 2024-09-09

## 2024-09-09 PROBLEM — T78.40XA ALLERGIC REACTION: Status: RESOLVED | Noted: 2023-11-02 | Resolved: 2024-09-09

## 2024-09-09 PROCEDURE — 3074F SYST BP LT 130 MM HG: CPT | Performed by: STUDENT IN AN ORGANIZED HEALTH CARE EDUCATION/TRAINING PROGRAM

## 2024-09-09 PROCEDURE — 3048F LDL-C <100 MG/DL: CPT | Performed by: STUDENT IN AN ORGANIZED HEALTH CARE EDUCATION/TRAINING PROGRAM

## 2024-09-09 PROCEDURE — 4010F ACE/ARB THERAPY RXD/TAKEN: CPT | Performed by: STUDENT IN AN ORGANIZED HEALTH CARE EDUCATION/TRAINING PROGRAM

## 2024-09-09 PROCEDURE — 3046F HEMOGLOBIN A1C LEVEL >9.0%: CPT | Performed by: STUDENT IN AN ORGANIZED HEALTH CARE EDUCATION/TRAINING PROGRAM

## 2024-09-09 PROCEDURE — 99396 PREV VISIT EST AGE 40-64: CPT | Performed by: STUDENT IN AN ORGANIZED HEALTH CARE EDUCATION/TRAINING PROGRAM

## 2024-09-09 PROCEDURE — 3078F DIAST BP <80 MM HG: CPT | Performed by: STUDENT IN AN ORGANIZED HEALTH CARE EDUCATION/TRAINING PROGRAM

## 2024-09-09 PROCEDURE — 3008F BODY MASS INDEX DOCD: CPT | Performed by: STUDENT IN AN ORGANIZED HEALTH CARE EDUCATION/TRAINING PROGRAM

## 2024-09-09 PROCEDURE — 3061F NEG MICROALBUMINURIA REV: CPT | Performed by: STUDENT IN AN ORGANIZED HEALTH CARE EDUCATION/TRAINING PROGRAM

## 2024-09-09 RX ORDER — SEMAGLUTIDE 0.68 MG/ML
0.5 INJECTION, SOLUTION SUBCUTANEOUS
Qty: 9 ML | Refills: 1 | Status: SHIPPED | OUTPATIENT
Start: 2024-09-09

## 2024-09-09 RX ORDER — BLOOD-GLUCOSE SENSOR
EACH MISCELLANEOUS
Qty: 6 EACH | Refills: 1 | Status: SHIPPED | OUTPATIENT
Start: 2024-09-09 | End: 2024-09-10

## 2024-09-09 RX ORDER — FLASH GLUCOSE SCANNING READER
EACH MISCELLANEOUS
Qty: 1 EACH | Refills: 0 | Status: SHIPPED | OUTPATIENT
Start: 2024-09-09

## 2024-09-09 RX ORDER — ERGOCALCIFEROL 1.25 MG/1
50000 CAPSULE ORAL WEEKLY
Qty: 12 CAPSULE | Refills: 0 | Status: SHIPPED | OUTPATIENT
Start: 2024-09-09 | End: 2024-12-02

## 2024-09-09 ASSESSMENT — PATIENT HEALTH QUESTIONNAIRE - PHQ9
1. LITTLE INTEREST OR PLEASURE IN DOING THINGS: NOT AT ALL
SUM OF ALL RESPONSES TO PHQ9 QUESTIONS 1 AND 2: 0
2. FEELING DOWN, DEPRESSED OR HOPELESS: NOT AT ALL

## 2024-09-09 NOTE — PROGRESS NOTES
Subjective   Patient ID: Michael Mulligan is a 49 y.o. male who presents for Annual Exam (Pt is here for a 6 month follow up and CPE.).    HPI   Pt has been working on lifesytle changes for sugar concerns.       Objective   Physical Exam  Vitals reviewed.   Constitutional:       Appearance: Normal appearance.   Cardiovascular:      Rate and Rhythm: Normal rate and regular rhythm.      Heart sounds: No murmur heard.  Pulmonary:      Effort: Pulmonary effort is normal. No respiratory distress.      Breath sounds: Normal breath sounds. No wheezing.   Musculoskeletal:      Cervical back: Neck supple.      Left lower leg: Edema present.   Neurological:      Mental Status: He is alert.       Assessment/Plan   Diagnoses and all orders for this visit:  Type 2 diabetes mellitus without complication, without long-term current use of insulin (Multi)  -     semaglutide (Ozempic) 0.25 mg or 0.5 mg (2 mg/3 mL) pen injector; Inject 0.5 mg under the skin every 7 days.  -     FreeStyle Augusto 2 [place]ot misc; Use as instructed  -     Hemoglobin A1C; Future  Vitamin D deficiency  -     ergocalciferol (Vitamin D-2) 1.25 MG (70292 UT) capsule; Take 1 capsule (50,000 Units) by mouth 1 (one) time per week.  Hypertension, unspecified type  Hypercholesterolemia      DM  A1c currently 9.7%  Goal of 7%  Comorbid conditions: HTN, Obesity, DSL  Will attempt for ozempic through insurance   Metformin if ozempic is not approved   Freestyle augusto ordereed    HTN  Continue losartan 100mg daily   Continue carvedilol 6.25mg daily   Continue amlodipine 10mg daily   Continue trimterene-hydrochlorothiazide 75-50mg daily   Physical exam was stable. Discussed maintaining diets such as DASH to help maintain normal Blood pressure. Encouraged regular exercise for a total of 120 min weekly. We will fu labs in 1-3 days. For now there will be no change in medical management. All questions and concerns were addressed. Pt will follow up in 4-6 months or sooner if  needed.      DSL  Continue rosuvastatin 40mg daily   Lipid panel ordered      Heart Murmur   Aortic regurgitation due to bicuspid valve  Cardiology following      Anxiety   Improved and now off of medication  Buspar 7.5mg BID      Thoracic aorta   Stable   Aneurysmal dilatation ascending thoracic aorta up to 4.8 cm  similar to prior.  Cardiology following      Health Maintenance  Colon cancer screening cologaurd ordered        Amna C Ray, DO 09/09/24 4:03 PM    Home

## 2024-09-10 RX ORDER — BLOOD-GLUCOSE SENSOR
EACH MISCELLANEOUS
Qty: 6 EACH | Refills: 1 | Status: SHIPPED | OUTPATIENT
Start: 2024-09-10

## 2024-09-11 DIAGNOSIS — E11.9 TYPE 2 DIABETES MELLITUS WITHOUT COMPLICATION, WITHOUT LONG-TERM CURRENT USE OF INSULIN (MULTI): Primary | ICD-10-CM

## 2024-09-11 RX ORDER — METFORMIN HYDROCHLORIDE 500 MG/1
500 TABLET ORAL
Qty: 200 TABLET | Refills: 3 | Status: SHIPPED | OUTPATIENT
Start: 2024-09-11 | End: 2025-10-16

## 2024-09-19 DIAGNOSIS — E11.9 TYPE 2 DIABETES MELLITUS WITHOUT COMPLICATION, WITHOUT LONG-TERM CURRENT USE OF INSULIN (MULTI): Primary | ICD-10-CM

## 2024-09-19 RX ORDER — FLASH GLUCOSE SENSOR
KIT MISCELLANEOUS
Qty: 6 EACH | Refills: 1 | Status: SHIPPED | OUTPATIENT
Start: 2024-09-19

## 2024-09-24 DIAGNOSIS — R73.9 HYPERGLYCEMIA: Primary | ICD-10-CM

## 2024-09-25 ENCOUNTER — HOSPITAL ENCOUNTER (OUTPATIENT)
Dept: OPERATING ROOM | Facility: CLINIC | Age: 49
Setting detail: OUTPATIENT SURGERY
Discharge: HOME | End: 2024-09-25
Payer: COMMERCIAL

## 2024-09-25 VITALS
TEMPERATURE: 97.7 F | BODY MASS INDEX: 40.17 KG/M2 | HEIGHT: 67 IN | RESPIRATION RATE: 16 BRPM | DIASTOLIC BLOOD PRESSURE: 65 MMHG | HEART RATE: 77 BPM | SYSTOLIC BLOOD PRESSURE: 138 MMHG | OXYGEN SATURATION: 96 % | WEIGHT: 255.95 LBS

## 2024-09-25 DIAGNOSIS — M54.16 RIGHT LUMBAR RADICULITIS: ICD-10-CM

## 2024-09-25 DIAGNOSIS — M51.36 DDD (DEGENERATIVE DISC DISEASE), LUMBAR: ICD-10-CM

## 2024-09-25 PROCEDURE — 7100000009 HC PHASE TWO TIME - INITIAL BASE CHARGE

## 2024-09-25 PROCEDURE — 64483 NJX AA&/STRD TFRM EPI L/S 1: CPT | Performed by: PHYSICAL MEDICINE & REHABILITATION

## 2024-09-25 PROCEDURE — 3600000006 HC OR TIME - EACH INCREMENTAL 1 MINUTE - PROCEDURE LEVEL ONE

## 2024-09-25 PROCEDURE — 7100000010 HC PHASE TWO TIME - EACH INCREMENTAL 1 MINUTE

## 2024-09-25 PROCEDURE — 2500000004 HC RX 250 GENERAL PHARMACY W/ HCPCS (ALT 636 FOR OP/ED): Performed by: PHYSICAL MEDICINE & REHABILITATION

## 2024-09-25 PROCEDURE — 2550000001 HC RX 255 CONTRASTS: Performed by: PHYSICAL MEDICINE & REHABILITATION

## 2024-09-25 PROCEDURE — 3600000001 HC OR TIME - INITIAL BASE CHARGE - PROCEDURE LEVEL ONE

## 2024-09-25 PROCEDURE — 2500000005 HC RX 250 GENERAL PHARMACY W/O HCPCS: Performed by: PHYSICAL MEDICINE & REHABILITATION

## 2024-09-25 RX ORDER — SODIUM CHLORIDE 9 MG/ML
INJECTION, SOLUTION INTRAMUSCULAR; INTRAVENOUS; SUBCUTANEOUS AS NEEDED
Status: COMPLETED | OUTPATIENT
Start: 2024-09-25 | End: 2024-09-25

## 2024-09-25 RX ORDER — LIDOCAINE HYDROCHLORIDE 5 MG/ML
INJECTION, SOLUTION INFILTRATION; INTRAVENOUS AS NEEDED
Status: COMPLETED | OUTPATIENT
Start: 2024-09-25 | End: 2024-09-25

## 2024-09-25 RX ORDER — TRIAMCINOLONE ACETONIDE 40 MG/ML
INJECTION, SUSPENSION INTRA-ARTICULAR; INTRAMUSCULAR AS NEEDED
Status: COMPLETED | OUTPATIENT
Start: 2024-09-25 | End: 2024-09-25

## 2024-09-25 ASSESSMENT — ENCOUNTER SYMPTOMS
DEPRESSION: 0
LOSS OF SENSATION IN FEET: 0
OCCASIONAL FEELINGS OF UNSTEADINESS: 0

## 2024-09-25 ASSESSMENT — COLUMBIA-SUICIDE SEVERITY RATING SCALE - C-SSRS
6. HAVE YOU EVER DONE ANYTHING, STARTED TO DO ANYTHING, OR PREPARED TO DO ANYTHING TO END YOUR LIFE?: NO
2. HAVE YOU ACTUALLY HAD ANY THOUGHTS OF KILLING YOURSELF?: NO
1. IN THE PAST MONTH, HAVE YOU WISHED YOU WERE DEAD OR WISHED YOU COULD GO TO SLEEP AND NOT WAKE UP?: NO

## 2024-09-25 ASSESSMENT — PAIN - FUNCTIONAL ASSESSMENT
PAIN_FUNCTIONAL_ASSESSMENT: 0-10
PAIN_FUNCTIONAL_ASSESSMENT: 0-10

## 2024-09-25 ASSESSMENT — PAIN DESCRIPTION - DESCRIPTORS: DESCRIPTORS: ACHING;SHARP

## 2024-09-25 ASSESSMENT — PAIN SCALES - GENERAL
PAINLEVEL_OUTOF10: 0 - NO PAIN
PAINLEVEL_OUTOF10: 4

## 2024-09-25 NOTE — H&P
History Of Present Illness  Michael Mulligan is a 49 y.o. male presenting with right L5 radic.     Past Medical History  Past Medical History:   Diagnosis Date    Allergic reaction 11/02/2023    Generalized anxiety disorder     BENJAMIN (generalized anxiety disorder)    Pericardial effusion ([facility]) 11/02/2023    Personal history of other mental and behavioral disorders     History of depression    Pure hypercholesterolemia, unspecified 12/14/2022    Hypercholesterolemia       Surgical History  Past Surgical History:   Procedure Laterality Date    OTHER SURGICAL HISTORY  03/30/2016    Pericardiectomy        Social History  He reports that he has never smoked. He has never used smokeless tobacco. He reports that he does not currently use alcohol. He reports that he does not use drugs.    Family History  Family History   Problem Relation Name Age of Onset    Breast cancer Mother      Heart attack Mother      Heart attack Father      Alcohol abuse Maternal Grandfather          Allergies  Cat's claw and Lipofen [fenofibrate]    Review of Systems   No cardio pulm symptoms  The patient does not want to proceed with any further interventions at this time with nerve blocks, radiofrequency ablation , epidural steroid injections or the more advanced modalities like spinal cord stimulator , Peripheral Nerve Stimulation , PNS and intra thecal pump delivery system . Physical Therapy already tried and produced minimal relief. Home exercises program is being followed along with the medications .  Currently working on the long term goal of cutting back the pain medications to a minimum and possible stopping pain medication altogether.   Physical Exam   Breathing normally and HR regular     Examination of the lumbar spine showed tight muscle bands in the mid and lower back area, this is more pronounced on the right compared to the left.  Additionally, this is inducing mild reversal of the lumbar lordosis with functional scoliosis  "with right-sided concavity.  This scoliosis corrected with  bending of the lumbar spine.     Straight leg raising increased the pain in the back and down the lateral aspect of the right knee onto the lateral leg to the lateral malleolus and foot.     There is decreased sensory to light touch on the lateral aspect of the thigh and around the right knee going down to the lateral aspect of the leg to the right lateral malleolus into the dorsum of the foot..    Deep tendon reflexes is present for the patellar tendons bilaterally.  Achilles reflexes are present bilaterally and symmetric.    Medial Hamstrings reflex is decreased on the right compared to the left side.  Plantar cutaneous are downgoing.  Ankle dorsiflexion is 5/5 bilaterally.  Plantar flexion of the ankles are 5/5 bilaterally.  Big toe extension is 4/5 on the right compared to 5/5 on the left side.    Negative Tinel's sign over the right peroneal nerve at the fibular neck.  Fely sign for axial loading and global rotation are negative.  No aberrant pain behavior.         Last Recorded Vitals  Blood pressure 157/74, pulse 76, temperature 36.2 °C (97.2 °F), temperature source Temporal, resp. rate 17, height 1.702 m (5' 7\"), weight 116 kg (255 lb 15.3 oz), SpO2 95%.    Relevant Results   Current Outpatient Medications   Medication Instructions    amLODIPine (NORVASC) 10 mg, oral, Daily    aspirin 81 mg EC tablet TAKE 1 TABLET Daily take 1 tablet daily with food    betamethasone dipropionate (Diprosone) 0.05 % ointment APPLY SPARINGLY TO AFFECTED AREA(S) ONCE DAILY    carvedilol (COREG) 6.25 mg, oral, 2 times daily (morning and late afternoon)    ergocalciferol (VITAMIN D-2) 50,000 Units, oral, Weekly    FreeStyle Augusto 2 [place] misc Use as instructed    FreeStyle Augusto 2 Sensor kit Use as instructed    ibuprofen 800 mg tablet TAKE 1 TABLET EVERY 8 HOURS WITH FOOD AS NEEDED.    losartan (COZAAR) 100 mg, oral, Daily, as directed    metFORMIN (GLUCOPHAGE) 500 " mg, oral, 2 times daily (morning and late afternoon)    Ozempic 0.5 mg, subcutaneous, Every 7 days    rosuvastatin (CRESTOR) 40 mg, oral, Daily    triamterene-hydrochlorothiazid (Maxzide) 75-50 mg tablet 1 tablet, oral, Daily         Assessment/Plan   Assessment & Plan  DDD (degenerative disc disease), lumbar    Right lumbar radiculitis      Proceed with R L5 Transforaminal epidural steroid injection           I spent 10 minutes in the professional and overall care of this patient.      Dao Galaviz MD

## 2024-09-25 NOTE — DISCHARGE INSTRUCTIONS
Discharge Instructions for Anesthesiology Pain Service Patients - Dr. Galaviz    Observe the needle site for excessive bleeding (slow general oozing that completely soaks the dressing or fresh bright red bleeding).  In either case, apply pressure to the area, elevate it if possible and call your doctor at once.    Observe/monitor for the following signs and symptoms:         Needle site:  change in color, numbness or tingling, coldness to touch, swelling, drainage       Temperature of 101.5 or higher       Increased or uncontrollable pain    If you notice any of the above signs or symptoms, please call your doctor at once.    Your pain may not be gone immediately after this procedure; it generally takes 3 to 5 days for the steroid to work.    Keep the needle site clean and dry for 24 hours.    Continue your present medications.    No driving or operating machinery for 24 hours if you have received sedation.    Make an appointment to see you doctor in 2-3 weeks    Central Scheduling Number:  [phone]  After hours Pain Management:  [phone].    If any problems occur, or if you have further questions, please call you doctor as soon as possible.  If you find that you cannot reach your doctor, but feel that your condition needs a doctors attention, go to the  emergency room nearest your home.

## 2024-09-25 NOTE — POST-PROCEDURE NOTE
Right L5 Transforaminal epidural steroid injection         Time-in:  936 am   Time-out:  949 am   Sedation:  none   . Patient responsive to verbal commands. Monitoring of the vitals signs performed by qualified Registered Nurse during the entire procedure  Please see sedation record for sedation by RN.    Indications: Failure to respond to conservative treatment with therapy and medications.    PROCEDURE:    The risks, benefits and alternatives of the procedure were discussed with the patient and agreed to proceed. The risks included but not limited to: infection, bleeding, paralysis, nerve injury sepsis and remotely death were discussed with the patient during the office and again in the pre procedure area.  The patient signed informed consent in the pre procedure area.     The patient was brought to procedure room and time out for the procedure was performed with the procedure room staff present. Patient placed in prone position on the procedure table and draped and covered appropriately.      Fluoroscopy machine was used to identify the right L5 neuroforamen    Skin prepped and draped in sterile fashion over the lower lumbar spine area.  Skin was infiltrated with local anesthetic with 0.5% lidocaine.  Spinal needle was introduced to the neuroforamen, verified with fluoroscopy.  Adequate flow of contrast dye around the nerve root was verified with fluoroscopy.  At that point, 40 mg of kenalog mixed with 5 mL of normal saline were injected.      felt the tingling down the lower limb during the injection     Procedure tolerated very well.  No complications encountered.  Post procedure care discussed with the patient, agreed to proceed.   Patient instructed on keeping track of the pain level post discharge.   Patient discharged home, from the recovery room, in stable condition.

## 2024-09-26 ASSESSMENT — PAIN SCALES - GENERAL: PAINLEVEL_OUTOF10: 2

## 2024-09-30 ENCOUNTER — TELEPHONE (OUTPATIENT)
Dept: PRIMARY CARE | Facility: CLINIC | Age: 49
End: 2024-09-30
Payer: COMMERCIAL

## 2024-09-30 DIAGNOSIS — E11.9 TYPE 2 DIABETES MELLITUS WITHOUT COMPLICATION, WITHOUT LONG-TERM CURRENT USE OF INSULIN (MULTI): Primary | ICD-10-CM

## 2024-09-30 RX ORDER — FLASH GLUCOSE SENSOR
KIT MISCELLANEOUS
Qty: 6 EACH | Refills: 1 | Status: SHIPPED | OUTPATIENT
Start: 2024-09-30

## 2024-10-02 ENCOUNTER — TELEMEDICINE (OUTPATIENT)
Dept: PHARMACY | Facility: HOSPITAL | Age: 49
End: 2024-10-02
Payer: COMMERCIAL

## 2024-10-02 DIAGNOSIS — R73.9 HYPERGLYCEMIA: ICD-10-CM

## 2024-10-02 DIAGNOSIS — E11.9 TYPE 2 DIABETES MELLITUS WITHOUT COMPLICATION, WITHOUT LONG-TERM CURRENT USE OF INSULIN (MULTI): Primary | ICD-10-CM

## 2024-10-02 PROCEDURE — RXMED WILLOW AMBULATORY MEDICATION CHARGE

## 2024-10-02 RX ORDER — SEMAGLUTIDE 0.68 MG/ML
0.25 INJECTION, SOLUTION SUBCUTANEOUS
Qty: 3 ML | Refills: 1 | Status: SHIPPED | OUTPATIENT
Start: 2024-10-02

## 2024-10-02 RX ORDER — LANCETS 33 GAUGE
EACH MISCELLANEOUS
Qty: 100 EACH | Refills: 11 | Status: SHIPPED | OUTPATIENT
Start: 2024-10-02

## 2024-10-02 RX ORDER — ISOPROPYL ALCOHOL 70 ML/100ML
SWAB TOPICAL
Qty: 200 EACH | Refills: 11 | Status: SHIPPED | OUTPATIENT
Start: 2024-10-02

## 2024-10-02 RX ORDER — BLOOD-GLUCOSE METER
EACH MISCELLANEOUS
Qty: 100 EACH | Refills: 11 | Status: SHIPPED | OUTPATIENT
Start: 2024-10-02

## 2024-10-02 RX ORDER — DEXTROSE 4 G
TABLET,CHEWABLE ORAL
Qty: 1 EACH | Refills: 0 | Status: SHIPPED | OUTPATIENT
Start: 2024-10-02

## 2024-10-02 NOTE — PATIENT INSTRUCTIONS
Thank you for entrusting your care to the Clinical Pharmacy Team! We look forward to seeing you again soon.  Please call your clinical pharmacist with any questions or concerns.    Changes made at today's visit:  STOP: metformin  START:   Ozempic - inject 0.25 mg under the skin once weekly for 4 doses; then, increase to 0.5 mg once weekly.  Your first pen of Ozempic will last you 6 doses (four 0.25 mg doses, then two 0.5 mg doses). Your next pen will only last for 4 doses of 0.5 mg.    You are enrolled in the [facility] Employee Value Based Insurance Design (VBID) program. This program allows you to receive for $0 co-pays on diabetes medications/supplies.  To maintain your eligibility for this program, you must:  Maintain  employee insurance coverage  Fill prescriptions at a  pharmacy for pick-up or home delivery  Complete a visit with a clinical pharmacist at least once annually    Michell Flowers, PharmD  P: [phone]    To schedule an appointment, please contact:  P: [phone]

## 2024-10-02 NOTE — Clinical Note
Employee $0 Diabetes Meds/Supplies Program (VBID) visit completed. Discussed pros vs cons of Ozempic vs Januvia w/ patient. Patient would prefer to try Ozempic at this time. Discussed how to admin and patient agreeable to start this weekend. Ozempic will be covered under insurance plan. Removed metformin and Januvia off patient's medication list. Will follow up in 1 month with patient for review of BGs and tolerability.

## 2024-10-02 NOTE — PROGRESS NOTES
[facility] ([place])    Michael Mulligan is a 49 y.o. male referred to Clinical Pharmacy Team to complete a comprehensive medication review (CMR) with a pharmacist as part of the Value Based Insurance Design (VBID) diabetes program.  Pharmacy team may also provide assistance in diabetes management per discussion with referring provider and/or endocrinology. Referring Provider: Amna White, DO    Does patient follow with Endocrinology: No    Subjective   Allergies   Allergen Reactions    Cat's Claw Anaphylaxis    Lipofen [Fenofibrate] Other       [id number] - [place], [place] - [address]  [address] [address]  Phone: [phone] Fax: [phone]    [facility]  [address] [address]  Phone: [phone] Fax: [phone]    Diabetes  He presents for his initial diabetic visit. He has type 2 diabetes mellitus. The initial diagnosis of diabetes was made 2 years ago. There are no hypoglycemic complications. Risk factors for coronary artery disease include diabetes mellitus, obesity, male sex and hypertension.     Pertinent PMH Review:  PMH of Pancreatitis: No  PMH of Retinopathy: No - last eye exam, 3 years  PMH of Urinary Tract Infections: No  PMH of MTC: No    HOME MONITORING  Monitoring Device:  None  Monitoring Frequency: Not currently monitoring  Any episodes of hypoglycemia? No, denies s/sx .      Objective     BP Readings from Last 6 Encounters:   09/25/24 138/65   09/09/24 128/64   04/23/24 132/56   11/03/23 156/72   12/14/22 136/68   04/05/22 150/71      Daily Weight  09/25/24 : 116 kg (255 lb 15.3 oz)  09/09/24 : 118 kg (259 lb 6.4 oz)  04/23/24 : 118 kg (259 lb 9.6 oz)  11/03/23 : 117 kg (257 lb 9 oz)  04/05/22 : 113 kg (249 lb 1 oz)  02/09/22 : 114 kg (250 lb 4 oz)       LAB  Lab Results   Component Value Date    BILITOT 0.6 09/06/2024    CALCIUM 9.3 09/06/2024    CO2 30 09/06/2024    CL 98 09/06/2024    CREATININE  "0.93 09/06/2024    GLUCOSE 251 (H) 09/06/2024    ALKPHOS 60 09/06/2024    K 3.1 (L) 09/06/2024    PROT 7.2 09/06/2024     09/06/2024    AST 21 09/06/2024    ALT 31 09/06/2024    BUN 18 09/06/2024    ANIONGAP 13 09/06/2024    ALBUMIN 4.3 09/06/2024     Lab Results   Component Value Date    TRIG 253 (H) 09/06/2024    CHOL 126 09/06/2024    LDLCALC 42 09/06/2024    HDL 33.8 09/06/2024     Lab Results   Component Value Date    HGBA1C 9.7 (H) 09/06/2024     Estimated body mass index is 40.09 kg/m² as calculated from the following:    Height as of 9/25/24: 1.702 m (5' 7\").    Weight as of 9/25/24: 116 kg (255 lb 15.3 oz).     Current Outpatient Medications on File Prior to Visit   Medication Sig Dispense Refill    amLODIPine (Norvasc) 10 mg tablet Take 1 tablet (10 mg) by mouth once daily. 90 tablet 3    aspirin 81 mg EC tablet TAKE 1 TABLET Daily take 1 tablet daily with food      betamethasone dipropionate (Diprosone) 0.05 % ointment APPLY SPARINGLY TO AFFECTED AREA(S) ONCE DAILY      carvedilol (Coreg) 6.25 mg tablet Take 1 tablet (6.25 mg) by mouth 2 times a day with meals. 180 tablet 1    ergocalciferol (Vitamin D-2) 1.25 MG (90991 UT) capsule Take 1 capsule (50,000 Units) by mouth 1 (one) time per week. 12 capsule 0    ibuprofen 200 mg tablet Take 3 tablets (600 mg) by mouth every 8 hours if needed for mild pain (1 - 3) or moderate pain (4 - 6).      losartan (Cozaar) 100 mg tablet TAKE 1 TABLET (100 MG) BY MOUTH ONCE DAILY. AS DIRECTED 90 tablet 3    rosuvastatin (Crestor) 40 mg tablet Take 1 tablet (40 mg) by mouth once daily. 90 tablet 3    triamterene-hydrochlorothiazid (Maxzide) 75-50 mg tablet Take 1 tablet by mouth once daily. 90 tablet 1    FreeStyle Augusto 2 [place] misc Use as instructed (Patient not taking: Reported on 10/2/2024) 1 each 0    FreeStyle Augusto 2 Sensor kit Use as instructed (Patient not taking: Reported on 10/2/2024) 6 each 1    [DISCONTINUED] FreeStyle Augusto 2 Sensor kit Use as " instructed 6 each 1    [DISCONTINUED] metFORMIN (Glucophage) 500 mg tablet Take 1 tablet (500 mg) by mouth 2 times daily (morning and late afternoon). (Patient not taking: Reported on 10/2/2024) 200 tablet 3    [DISCONTINUED] semaglutide (Ozempic) 0.25 mg or 0.5 mg (2 mg/3 mL) pen injector Inject 0.5 mg under the skin every 7 days. 9 mL 1    [DISCONTINUED] SITagliptin phosphate (Januvia) 50 mg tablet Take 1 tablet (50 mg) by mouth once daily. 30 tablet 11     No current facility-administered medications on file prior to visit.        MEDICATION RECONCILIATION  Added:   None  Changed:   Ibuprofen - dose/frequency updated to 600 mg Q8H PRN pain, reports using less since having injections for back pain, using OTC tablets  Ozempic - not currently taking (Not yet started)  Removed:   Metformin - self discontinued d/t side effects, patient instructed to stop therapy  Januvia - not currently taking, discontinued to trial Ozempic first    Drug Interactions:  None warranting change in therapy at this time    CURRENT DIABETES PHARMACOTHERAPY  Metformin 500 mg - one tab (500 mg) BID, stopped d/t side effects (headache, severe diarrhea)    HISTORICAL PHARMACOTHERAPY (if relevant)  None    PREVENTATIVE PHARMACOTHERAPY  Statin? yes, rosuvastatin 40 mg  LDL: at goal < 70 mg/dL  ACE-I/ARB? yes, losartan 100 mg   BP: improved, managed by cardio, < 130/80  UACR: normal (< 30 mg/g)  Aspirin?  yes    Assessment/Plan   Problem List Items Addressed This Visit    None  Visit Diagnoses       Type 2 diabetes mellitus without complication, without long-term current use of insulin (Multi)    -  Primary    Relevant Medications    semaglutide (Ozempic) 0.25 mg or 0.5 mg (2 mg/3 mL) pen injector    alcohol swabs pads, medicated    lancets (OneTouch Delica Plus Lancet) 33 gauge misc    blood sugar diagnostic (OneTouch Verio test strips) strip    blood-glucose meter (OneTouch Verio Flex meter) misc    Other Relevant Orders    Follow Up In Clinical  Pharmacy    Hyperglycemia                 Diabetes:  Patient's Type 2 diabetes is uncontrolled with most recent A1c of 9.7% on 9/6/24 (Goal < 7%). Patient has had A1c diagnostic of diabetes for approx 3 years (7.7% 11/22/21) however, recently increased to 9.7% and initiated on pharmacotherapy, metformin. Patient trialed metformin 500 mg BID; however, had to discontinue due to headache and severe diarrhea. PCP considered alternatives Ozempic and Januvia; however, patient reports not initiating either therapy yet, pending insurance coverage. At today's visit, discussed pros/cons of Ozempic vs. Januvia and patient prefers to trial Ozempic first, favoring once weekly administration, cardiovascular risk reduction and weight loss benefits. Plan to initiate 0.25 mg once weekly dose with follow up 1 month for tolerability check in and titration if appropriate.   Stop:  Start:  Ozempic 0.25 mg injected subcutaneously once weekly    Comprehensive Medication Review:  Reviewed all medications on patient medication list in EMR. Discussed indication, administration, MOA and possible side effects to medications. Answered all patient questions and concerns.   Patient denies side effects with medications other than discussed above.   Adherence is expected to be Good.   Additional concerns with medication list: none    VBID Employee Diabetes Program Enrollment: New Enrollment  Patient enrolled in  Employee diabetes program for $0 co-pays on diabetes medications/supplies. New Enrollment enrollment should be active in 2-4 weeks.  Requested VBID enrollment date: 9/20/24  PharmD Management Level: Level 3-4   Pharmacy fill location: [facility]    Follow up:  1 month      Michell Flowers PharmD     Continue all meds under the continuation of care with the referring provider and clinical pharmacy team. Patient/Caregiver was informed they may decline to participate or withdraw from participation in pharmacy services at any  time.

## 2024-10-04 ENCOUNTER — PHARMACY VISIT (OUTPATIENT)
Dept: PHARMACY | Facility: CLINIC | Age: 49
End: 2024-10-04
Payer: COMMERCIAL

## 2024-10-31 ENCOUNTER — APPOINTMENT (OUTPATIENT)
Dept: PHARMACY | Facility: HOSPITAL | Age: 49
End: 2024-10-31
Payer: COMMERCIAL

## 2024-10-31 DIAGNOSIS — E11.9 TYPE 2 DIABETES MELLITUS WITHOUT COMPLICATION, WITHOUT LONG-TERM CURRENT USE OF INSULIN (MULTI): Primary | ICD-10-CM

## 2024-11-01 ENCOUNTER — TELEMEDICINE (OUTPATIENT)
Dept: PHARMACY | Facility: HOSPITAL | Age: 49
End: 2024-11-01
Payer: COMMERCIAL

## 2024-11-01 DIAGNOSIS — E11.9 TYPE 2 DIABETES MELLITUS WITHOUT COMPLICATION, WITHOUT LONG-TERM CURRENT USE OF INSULIN (MULTI): ICD-10-CM

## 2024-11-01 PROCEDURE — RXMED WILLOW AMBULATORY MEDICATION CHARGE

## 2024-11-01 RX ORDER — FLASH GLUCOSE SENSOR
KIT MISCELLANEOUS
Qty: 2 EACH | Refills: 11 | Status: SHIPPED | OUTPATIENT
Start: 2024-11-01

## 2024-11-14 ENCOUNTER — PHARMACY VISIT (OUTPATIENT)
Dept: PHARMACY | Facility: CLINIC | Age: 49
End: 2024-11-14
Payer: COMMERCIAL

## 2024-11-29 ENCOUNTER — APPOINTMENT (OUTPATIENT)
Dept: PHARMACY | Facility: HOSPITAL | Age: 49
End: 2024-11-29
Payer: COMMERCIAL

## 2024-11-29 DIAGNOSIS — E11.9 TYPE 2 DIABETES MELLITUS WITHOUT COMPLICATION, WITHOUT LONG-TERM CURRENT USE OF INSULIN (MULTI): Primary | ICD-10-CM

## 2024-11-29 PROCEDURE — RXMED WILLOW AMBULATORY MEDICATION CHARGE

## 2024-11-29 NOTE — PROGRESS NOTES
[facility] (VBID)    Michael Mulligan is a 49 y.o. male presents to Clinical Pharmacy Team for follow up as part of the Value Based Insurance Design (VBID) diabetes program.  Pharmacy team may also provide assistance in diabetes management per discussion with referring provider and/or endocrinology. Referring Provider: Amna White DO    Does patient follow with Endocrinology: No    Subjective   Allergies   Allergen Reactions    Cat's Claw Anaphylaxis    Metformin Diarrhea, Headache and GI Upset     Patient reports severe diarrhea accompanied by GI upset and headache.     Lipofen [Fenofibrate] Other     Diabetes  He presents for his follow-up diabetic visit. He has type 2 diabetes mellitus. The initial diagnosis of diabetes was made 2 years ago. There are no hypoglycemic complications. Risk factors for coronary artery disease include diabetes mellitus, obesity, male sex and hypertension.     Pertinent PMH Review:  PMH of Pancreatitis: No  PMH of Retinopathy: No - last eye exam, 3 years  PMH of Urinary Tract Infections: No  PMH of MTC: No    HOME MONITORING  Monitoring Device: OneTouch Verio Flex, Freestyle Augusto 2 CGM (paying out of pocket)  Monitoring Frequency: continuous, as needed  Any episodes of hypoglycemia? No, denies s/sx .    CGM Data - Pt reported  AM - 114-115 mg/dL  Throughout day - 114-150 mg/dL  Highest witnessed after meal 240 mg/d  2 hr PPG - 150 mg/dL    BARRIERS TO ADHERENCE  Affordability/Accessibility:  insurance, enrolled in VBID program  Missed doses: none  Side effects: denies adverse effects, reports positive change in appetite suppression and general unhealthy food cravings    Objective     BP Readings from Last 6 Encounters:   09/25/24 138/65   09/09/24 128/64   04/23/24 132/56   11/03/23 156/72   12/14/22 136/68   04/05/22 150/71      Daily Weight  09/25/24 : 116 kg (255 lb 15.3 oz)  09/09/24 : 118 kg (259 lb 6.4 oz)  04/23/24 : 118 kg  "(259 lb 9.6 oz)  11/03/23 : 117 kg (257 lb 9 oz)  04/05/22 : 113 kg (249 lb 1 oz)  02/09/22 : 114 kg (250 lb 4 oz)       LAB  Lab Results   Component Value Date    BILITOT 0.6 09/06/2024    CALCIUM 9.3 09/06/2024    CO2 30 09/06/2024    CL 98 09/06/2024    CREATININE 0.93 09/06/2024    GLUCOSE 251 (H) 09/06/2024    ALKPHOS 60 09/06/2024    K 3.1 (L) 09/06/2024    PROT 7.2 09/06/2024     09/06/2024    AST 21 09/06/2024    ALT 31 09/06/2024    BUN 18 09/06/2024    ANIONGAP 13 09/06/2024    ALBUMIN 4.3 09/06/2024     Lab Results   Component Value Date    TRIG 253 (H) 09/06/2024    CHOL 126 09/06/2024    LDLCALC 42 09/06/2024    HDL 33.8 09/06/2024     Lab Results   Component Value Date    HGBA1C 9.7 (H) 09/06/2024     Estimated body mass index is 40.09 kg/m² as calculated from the following:    Height as of 9/25/24: 1.702 m (5' 7\").    Weight as of 9/25/24: 116 kg (255 lb 15.3 oz).     Current Outpatient Medications on File Prior to Visit   Medication Sig Dispense Refill    alcohol swabs pads, medicated Use one swab to cleanse skin and allow to dry prior to injecting medications or testing blood glucose. 200 each 11    amLODIPine (Norvasc) 10 mg tablet TAKE 1 TABLET BY MOUTH EVERY DAY 90 tablet 3    aspirin 81 mg EC tablet TAKE 1 TABLET Daily take 1 tablet daily with food      betamethasone dipropionate (Diprosone) 0.05 % ointment APPLY SPARINGLY TO AFFECTED AREA(S) ONCE DAILY      blood sugar diagnostic (OneTouch Verio test strips) strip Use new test strip with meter each time to test blood glucose up to four times daily as needed. 100 each 11    blood-glucose meter (OneTouch Verio Flex meter) misc Use as directed to test blood glucose up to four times daily as needed. 1 each 0    carvedilol (Coreg) 6.25 mg tablet TAKE 1 TABLET (6.25 MG) BY MOUTH TWICE A DAY WITH MEALS 180 tablet 3    ergocalciferol (Vitamin D-2) 1.25 MG (51137 UT) capsule Take 1 capsule (50,000 Units) by mouth 1 (one) time per week. 12 capsule " 0    FreeStyle Augusto 2 [place] misc Use as instructed (Patient not taking: Reported on 10/2/2024) 1 each 0    FreeStyle Augusto 2 Sensor kit Apply to upper arm. Remove and replace every 14 days. 2 each 11    ibuprofen 200 mg tablet Take 3 tablets (600 mg) by mouth every 8 hours if needed for mild pain (1 - 3) or moderate pain (4 - 6).      lancets (OneTouch Delica Plus Lancet) 33 gauge misc Use new lancet each time with lancing device to prick finger and test blood glucose up to four times daily as needed. 100 each 11    losartan (Cozaar) 100 mg tablet TAKE 1 TABLET (100 MG) BY MOUTH ONCE DAILY. AS DIRECTED 90 tablet 3    rosuvastatin (Crestor) 40 mg tablet TAKE 1 TABLET BY MOUTH EVERY DAY 90 tablet 3    triamterene-hydrochlorothiazid (Maxzide) 75-50 mg tablet Take 1 tablet by mouth once daily. 90 tablet 1    [DISCONTINUED] semaglutide 0.25 mg or 0.5 mg (2 mg/3 mL) pen injector Inject 0.5 mg under the skin 1 (one) time per week. 3 mL 1     No current facility-administered medications on file prior to visit.      CURRENT DIABETES PHARMACOTHERAPY  Ozempic 0.5 mg injected subcutaneously once weekly    HISTORICAL PHARMACOTHERAPY (if relevant)  Metformin - one tab (500 mg) BID, stopped d/t side effects (headache, severe diarrhea)     PREVENTATIVE PHARMACOTHERAPY  Statin? yes, rosuvastatin 40 mg  LDL: at goal < 70 mg/dL  ACE-I/ARB? yes, losartan 100 mg   BP: improved, managed by cardio, < 130/80  UACR: normal (< 30 mg/g)  Aspirin?  yes    VBID Employee Diabetes Program Enrollment: Up to date  Patient enrolled in  Employee diabetes program for $0 co-pays on diabetes medications/supplies. Requested VBID enrollment date: 9/20/24  PharmD Management Level: Level 3-4   Pharmacy fill location: [facility]    Assessment/Plan   Problem List Items Addressed This Visit    None  Visit Diagnoses       Type 2 diabetes mellitus without complication, without long-term current use of insulin (Multi)    -  Primary     Relevant Medications    semaglutide (OZEMPIC) 1 mg/dose (4 mg/3 mL) pen injector    Other Relevant Orders    Referral to Clinical Pharmacy          Diabetes:  Patient's Type 2 diabetes is uncontrolled with most recent A1c of 9.7% on 9/6/24 (Goal < 7%); however, SMBGs greatly improved and anticipated to be in goal ranges most of the time. Overall, patient is doing very well on current therapy and denies any adverse side effects. Reports administering four doses of Ozempic 0.5 mg injected subcutaneously once weekly. reports continued improvement in appetite (decrease), cravings and stress eating. Plan to increase to 1 mg dose injected once weekly, follow up in 3-4 weeks.    Change:  Increase to Ozempic 1 mg injected subcutaneously once weekly    Follow up: 12/20/24 at 1:20 PM      Michell Flowers, BrendaD     Continue all meds under the continuation of care with the referring provider and clinical pharmacy team. Patient/Caregiver was informed they may decline to participate or withdraw from participation in pharmacy services at any time.

## 2024-11-29 NOTE — PATIENT INSTRUCTIONS
Review of changes we discussed today:  Increase Ozempic to 1 mg injected under the skin once weekly.    Follow up: 12/20/24 at 1:20 PM via phone    -----------------------------------------------------------    You are enrolled in the [facility] Employee Value Based Insurance Design (VBID) program. This program allows you to receive for $0 co-pays on diabetes medications/supplies.  To maintain your eligibility for this program, you must:  Maintain  employee insurance coverage  Fill prescriptions at a  pharmacy for pick-up or home delivery  Complete a visit with a clinical pharmacist at least once annually    Michell Flowers, PharmD  P: [phone]    To schedule an appointment, please contact:  P: [phone]      Thank you for entrusting your care to the Clinical Pharmacy Team! We look forward to seeing you again soon.  Please call your clinical pharmacist with any questions or concerns.

## 2024-11-29 NOTE — Clinical Note
VBID Follow Up. Doing very well on Ozempic 0.5 mg once weekly, BGs expected to be in target range most of the time. Will increase to 1 mg once weekly and follow up in 3 weeks to assess BGs and tolerability.

## 2024-12-02 ENCOUNTER — PHARMACY VISIT (OUTPATIENT)
Dept: PHARMACY | Facility: CLINIC | Age: 49
End: 2024-12-02
Payer: COMMERCIAL

## 2024-12-10 ENCOUNTER — APPOINTMENT (OUTPATIENT)
Dept: PRIMARY CARE | Facility: CLINIC | Age: 49
End: 2024-12-10
Payer: COMMERCIAL

## 2024-12-20 ENCOUNTER — APPOINTMENT (OUTPATIENT)
Dept: PHARMACY | Facility: HOSPITAL | Age: 49
End: 2024-12-20
Payer: COMMERCIAL

## 2024-12-20 DIAGNOSIS — E11.9 TYPE 2 DIABETES MELLITUS WITHOUT COMPLICATION, WITHOUT LONG-TERM CURRENT USE OF INSULIN (MULTI): ICD-10-CM

## 2024-12-20 NOTE — PROGRESS NOTES
[facility] (VBID)    Michael Mulligan is a 49 y.o. male presents to Clinical Pharmacy Team for follow up as part of the Value Based Insurance Design (VBID) diabetes program.  Pharmacy team may also provide assistance in diabetes management per discussion with referring provider and/or endocrinology. Referring Provider: Amna White DO    Does patient follow with Endocrinology: No    Subjective   Allergies   Allergen Reactions    Cat's Claw Anaphylaxis    Metformin Diarrhea, Headache and GI Upset     Patient reports severe diarrhea accompanied by GI upset and headache.     Lipofen [Fenofibrate] Other     Diabetes  He presents for his follow-up diabetic visit. He has type 2 diabetes mellitus. The initial diagnosis of diabetes was made 2 years ago. There are no hypoglycemic complications. Risk factors for coronary artery disease include diabetes mellitus, obesity, male sex and hypertension.     Pertinent PMH Review:  PMH of Pancreatitis: No  PMH of Retinopathy: No - last eye exam, 3 years  PMH of Urinary Tract Infections: No  PMH of MTC: No    HOME MONITORING  Monitoring Device: OneTouch Verio Flex, Freestyle Augusto 2 CGM (paying out of pocket)  Monitoring Frequency: continuous, as needed  Any episodes of hypoglycemia? No, denies s/sx .    CGM Data - Pt reported  AM - 110s mg/dL  Throughout day - 110-150 mg/dL  Highest witnessed after meal 238 mg/d  2 hr PPG - 130 mg/dL    BARRIERS TO ADHERENCE  Affordability/Accessibility:  insurance, enrolled in VBID program  Missed doses: none  Side effects: denies adverse effects, reports positive change in appetite suppression and general unhealthy food cravings    Objective     BP Readings from Last 6 Encounters:   09/25/24 138/65   09/09/24 128/64   04/23/24 132/56   11/03/23 156/72   12/14/22 136/68   04/05/22 150/71      Daily Weight  09/25/24 : 116 kg (255 lb 15.3 oz)  09/09/24 : 118 kg (259 lb 6.4 oz)  04/23/24 : 118 kg (259  "lb 9.6 oz)  11/03/23 : 117 kg (257 lb 9 oz)  04/05/22 : 113 kg (249 lb 1 oz)  02/09/22 : 114 kg (250 lb 4 oz)       LAB  Lab Results   Component Value Date    BILITOT 0.6 09/06/2024    CALCIUM 9.3 09/06/2024    CO2 30 09/06/2024    CL 98 09/06/2024    CREATININE 0.93 09/06/2024    GLUCOSE 251 (H) 09/06/2024    ALKPHOS 60 09/06/2024    K 3.1 (L) 09/06/2024    PROT 7.2 09/06/2024     09/06/2024    AST 21 09/06/2024    ALT 31 09/06/2024    BUN 18 09/06/2024    ANIONGAP 13 09/06/2024    ALBUMIN 4.3 09/06/2024     Lab Results   Component Value Date    TRIG 253 (H) 09/06/2024    CHOL 126 09/06/2024    LDLCALC 42 09/06/2024    HDL 33.8 09/06/2024     Lab Results   Component Value Date    HGBA1C 9.7 (H) 09/06/2024     Estimated body mass index is 40.09 kg/m² as calculated from the following:    Height as of 9/25/24: 1.702 m (5' 7\").    Weight as of 9/25/24: 116 kg (255 lb 15.3 oz).     Current Outpatient Medications on File Prior to Visit   Medication Sig Dispense Refill    alcohol swabs pads, medicated Use one swab to cleanse skin and allow to dry prior to injecting medications or testing blood glucose. 200 each 11    amLODIPine (Norvasc) 10 mg tablet TAKE 1 TABLET BY MOUTH EVERY DAY 90 tablet 3    aspirin 81 mg EC tablet TAKE 1 TABLET Daily take 1 tablet daily with food      betamethasone dipropionate (Diprosone) 0.05 % ointment APPLY SPARINGLY TO AFFECTED AREA(S) ONCE DAILY      blood sugar diagnostic (OneTouch Verio test strips) strip Use new test strip with meter each time to test blood glucose up to four times daily as needed. 100 each 11    blood-glucose meter (OneTouch Verio Flex meter) misc Use as directed to test blood glucose up to four times daily as needed. 1 each 0    carvedilol (Coreg) 6.25 mg tablet TAKE 1 TABLET (6.25 MG) BY MOUTH TWICE A DAY WITH MEALS 180 tablet 3    FreeStyle Augusto 2 [place] misc Use as instructed (Patient not taking: Reported on 10/2/2024) 1 each 0    FreeStyle Augusto 2 Sensor kit " Apply to upper arm. Remove and replace every 14 days. 2 each 11    ibuprofen 200 mg tablet Take 3 tablets (600 mg) by mouth every 8 hours if needed for mild pain (1 - 3) or moderate pain (4 - 6).      lancets (OneTouch Delica Plus Lancet) 33 gauge misc Use new lancet each time with lancing device to prick finger and test blood glucose up to four times daily as needed. 100 each 11    losartan (Cozaar) 100 mg tablet TAKE 1 TABLET (100 MG) BY MOUTH ONCE DAILY. AS DIRECTED 90 tablet 3    rosuvastatin (Crestor) 40 mg tablet TAKE 1 TABLET BY MOUTH EVERY DAY 90 tablet 3    semaglutide (OZEMPIC) 1 mg/dose (4 mg/3 mL) pen injector Inject 1 mg under the skin 1 (one) time per week. 3 mL 1    triamterene-hydrochlorothiazid (Maxzide) 75-50 mg tablet Take 1 tablet by mouth once daily. 90 tablet 1     No current facility-administered medications on file prior to visit.      CURRENT DIABETES PHARMACOTHERAPY  Ozempic 1 mg injected subcutaneously once weekly    HISTORICAL PHARMACOTHERAPY (if relevant)  Metformin - one tab (500 mg) BID, stopped d/t side effects (headache, severe diarrhea)     PREVENTATIVE PHARMACOTHERAPY  Statin? yes, rosuvastatin 40 mg  LDL: at goal < 70 mg/dL  ACE-I/ARB? yes, losartan 100 mg   BP: improved, managed by cardio, < 130/80  UACR: normal (< 30 mg/g)  Aspirin?  yes    VBID Employee Diabetes Program Enrollment: Up to date  Patient enrolled in  Employee diabetes program for $0 co-pays on diabetes medications/supplies. Requested VBID enrollment date: 9/20/24  PharmD Management Level: Level 3-4   Pharmacy fill location: [facility]    Assessment/Plan   Problem List Items Addressed This Visit    None  Visit Diagnoses       Type 2 diabetes mellitus without complication, without long-term current use of insulin (Multi)              Diabetes:  Patient's Type 2 diabetes is uncontrolled with most recent A1c of 9.7% on 9/6/24 (Goal < 7%); however, SMBGs greatly improved and anticipated to be in goal  ranges most of the time. Overall, patient is doing very well on current therapy, endorses at times nausea after eating a larger meal but subsides quickly. To compensate for mild intermittent nausea, has been cutting back on portion size. Reports administering two doses of Ozempic 1 mg injected subcutaneously once weekly. reports continued improvement in appetite (decrease), cravings and stress eating. Plan to maintain on the 1 mg dose of Ozempic weekly, follow up in 4-5 weeks to assess dosing and nausea again.    Plan:  Continue taking Ozempic 1 mg injected subcutaneously once weekly    Follow up: 1/24/25 at 1:20 PM      Taz Ndiaye, PharmD     Continue all meds under the continuation of care with the referring provider and clinical pharmacy team. Patient/Caregiver was informed they may decline to participate or withdraw from participation in pharmacy services at any time.

## 2025-01-08 ENCOUNTER — APPOINTMENT (OUTPATIENT)
Dept: PRIMARY CARE | Facility: CLINIC | Age: 50
End: 2025-01-08
Payer: COMMERCIAL

## 2025-01-08 VITALS
OXYGEN SATURATION: 97 % | DIASTOLIC BLOOD PRESSURE: 64 MMHG | SYSTOLIC BLOOD PRESSURE: 136 MMHG | HEART RATE: 76 BPM | HEIGHT: 67 IN | BODY MASS INDEX: 39.05 KG/M2 | WEIGHT: 248.8 LBS

## 2025-01-08 DIAGNOSIS — E78.00 HYPERCHOLESTEROLEMIA: ICD-10-CM

## 2025-01-08 DIAGNOSIS — I10 HYPERTENSION, UNSPECIFIED TYPE: Primary | ICD-10-CM

## 2025-01-08 DIAGNOSIS — E11.9 TYPE 2 DIABETES MELLITUS WITHOUT COMPLICATION, WITHOUT LONG-TERM CURRENT USE OF INSULIN (MULTI): ICD-10-CM

## 2025-01-08 LAB — POC HEMOGLOBIN A1C: 6.6 % (ref 4.2–6.5)

## 2025-01-08 PROCEDURE — 3075F SYST BP GE 130 - 139MM HG: CPT | Performed by: STUDENT IN AN ORGANIZED HEALTH CARE EDUCATION/TRAINING PROGRAM

## 2025-01-08 PROCEDURE — 4010F ACE/ARB THERAPY RXD/TAKEN: CPT | Performed by: STUDENT IN AN ORGANIZED HEALTH CARE EDUCATION/TRAINING PROGRAM

## 2025-01-08 PROCEDURE — 99214 OFFICE O/P EST MOD 30 MIN: CPT | Performed by: STUDENT IN AN ORGANIZED HEALTH CARE EDUCATION/TRAINING PROGRAM

## 2025-01-08 PROCEDURE — 3008F BODY MASS INDEX DOCD: CPT | Performed by: STUDENT IN AN ORGANIZED HEALTH CARE EDUCATION/TRAINING PROGRAM

## 2025-01-08 PROCEDURE — 1036F TOBACCO NON-USER: CPT | Performed by: STUDENT IN AN ORGANIZED HEALTH CARE EDUCATION/TRAINING PROGRAM

## 2025-01-08 PROCEDURE — 3078F DIAST BP <80 MM HG: CPT | Performed by: STUDENT IN AN ORGANIZED HEALTH CARE EDUCATION/TRAINING PROGRAM

## 2025-01-08 PROCEDURE — 83036 HEMOGLOBIN GLYCOSYLATED A1C: CPT | Performed by: STUDENT IN AN ORGANIZED HEALTH CARE EDUCATION/TRAINING PROGRAM

## 2025-01-08 ASSESSMENT — PATIENT HEALTH QUESTIONNAIRE - PHQ9
2. FEELING DOWN, DEPRESSED OR HOPELESS: NOT AT ALL
1. LITTLE INTEREST OR PLEASURE IN DOING THINGS: NOT AT ALL
SUM OF ALL RESPONSES TO PHQ9 QUESTIONS 1 AND 2: 0

## 2025-01-08 NOTE — PROGRESS NOTES
Subjective   Patient ID: Michael Mulligan is a 49 y.o. male who presents for Follow-up (Pt is here for a dm follow up.).    HPI   Interval Hx:  Norovirus right after beligca   Bowels are back to normal   Doing much better     DM   Working with pharmacy   Initial nausea going from 0.5 to 1mg but has subsided   Ozempic 1 mg weekly       Current Outpatient Medications:     alcohol swabs pads, medicated, Use one swab to cleanse skin and allow to dry prior to injecting medications or testing blood glucose., Disp: 200 each, Rfl: 11    amLODIPine (Norvasc) 10 mg tablet, TAKE 1 TABLET BY MOUTH EVERY DAY, Disp: 90 tablet, Rfl: 3    aspirin 81 mg EC tablet, TAKE 1 TABLET Daily take 1 tablet daily with food, Disp: , Rfl:     betamethasone dipropionate (Diprosone) 0.05 % ointment, APPLY SPARINGLY TO AFFECTED AREA(S) ONCE DAILY, Disp: , Rfl:     blood sugar diagnostic (OneTouch Verio test strips) strip, Use new test strip with meter each time to test blood glucose up to four times daily as needed., Disp: 100 each, Rfl: 11    blood-glucose meter (OneTouch Verio Flex meter) misc, Use as directed to test blood glucose up to four times daily as needed., Disp: 1 each, Rfl: 0    carvedilol (Coreg) 6.25 mg tablet, TAKE 1 TABLET (6.25 MG) BY MOUTH TWICE A DAY WITH MEALS, Disp: 180 tablet, Rfl: 3    FreeStyle Augusto 2 [place] misc, Use as instructed, Disp: 1 each, Rfl: 0    FreeStyle Augusto 2 Sensor kit, Apply to upper arm. Remove and replace every 14 days., Disp: 2 each, Rfl: 11    ibuprofen 200 mg tablet, Take 3 tablets (600 mg) by mouth every 8 hours if needed for mild pain (1 - 3) or moderate pain (4 - 6)., Disp: , Rfl:     lancets (OneTouch Delica Plus Lancet) 33 gauge misc, Use new lancet each time with lancing device to prick finger and test blood glucose up to four times daily as needed., Disp: 100 each, Rfl: 11    losartan (Cozaar) 100 mg tablet, TAKE 1 TABLET (100 MG) BY MOUTH ONCE DAILY. AS DIRECTED, Disp: 90 tablet, Rfl:  "3    rosuvastatin (Crestor) 40 mg tablet, TAKE 1 TABLET BY MOUTH EVERY DAY, Disp: 90 tablet, Rfl: 3    semaglutide (OZEMPIC) 1 mg/dose (4 mg/3 mL) pen injector, Inject 1 mg under the skin 1 (one) time per week., Disp: 3 mL, Rfl: 1    triamterene-hydrochlorothiazid (Maxzide) 75-50 mg tablet, Take 1 tablet by mouth once daily., Disp: 90 tablet, Rfl: 1    Visit Vitals  /64   Pulse 76   Ht 1.702 m (5' 7\")   Wt 113 kg (248 lb 12.8 oz)   SpO2 97%   BMI 38.97 kg/m²   Smoking Status Never   BSA 2.31 m²       Objective   Physical Exam    Assessment/Plan   Diagnoses and all orders for this visit:  Hypertension, unspecified type  Type 2 diabetes mellitus without complication, without long-term current use of insulin (Multi)  -     POCT glycosylated hemoglobin (Hb A1C) manually resulted  -     CBC and Auto Differential; Future  -     Comprehensive metabolic panel; Future  Hypercholesterolemia         DM  A1c currently 6.6%  Goal of <7%  Comorbid conditions: HTN, Obesity, DSL  Continue Ozempic 1 mg weekly   Working with clinical pharmacy  Statin: yes   ACE/ARB: yes    Albumin:Cr completed 2023  Weight loss of 11lbs     HTN  136/64mmHg  Continue losartan 100mg daily   Continue carvedilol 6.25mg daily   Continue amlodipine 10mg daily   Continue trimterene-hydrochlorothiazide 75-50mg daily   Physical exam was stable. Discussed maintaining diets such as DASH to help maintain normal Blood pressure. Encouraged regular exercise for a total of 120 min weekly. We will fu labs in 1-3 days. For now there will be no change in medical management. All questions and concerns were addressed. Pt will follow up in 4-6 months or sooner if needed.      DSL  Continue rosuvastatin 40mg daily   Lipid Panel      Heart Murmur   Aortic regurgitation due to bicuspid valve  Cardiology following      Anxiety   Improved and now off of medication  Buspar 7.5mg BID      Thoracic aorta   Stable   Aneurysmal dilatation ascending thoracic aorta up to 4.8 " cm  similar to prior.  Cardiology following     Health Maintenance   Topic Date Due    HIV Screening  Never done    Colorectal Cancer Screening  Never done    Diabetes: Retinopathy Screening  Never done    Hepatitis C Screening  Never done    Hepatitis B Vaccines (1 of 3 - 19+ 3-dose series) Never done    Pneumococcal Vaccine (1 of 2 - PCV) 08/28/1993    DTaP/Tdap/Td Vaccines (1 - Tdap) Never done    MMR Vaccines (1 of 1 - Standard series) Never done    Zoster Vaccines (1 of 2) Never done    Influenza Vaccine (1) 09/01/2024    COVID-19 Vaccine (4 - 2024-25 season) 09/01/2024    Diabetes: Hemoglobin A1C  04/08/2025    Lipid Panel  09/06/2025    Diabetes: Urine Protein Screening  09/06/2025    Yearly Adult Physical  09/10/2025    HIB Vaccines  Aged Out    IPV Vaccines  Aged Out    Hepatitis A Vaccines  Aged Out    Meningococcal Vaccine  Aged Out    Rotavirus Vaccines  Aged Out    HPV Vaccines  Aged Out            Amna White DO 01/08/25 2:05 PM

## 2025-01-10 ENCOUNTER — PHARMACY VISIT (OUTPATIENT)
Dept: PHARMACY | Facility: CLINIC | Age: 50
End: 2025-01-10
Payer: COMMERCIAL

## 2025-01-10 PROCEDURE — RXMED WILLOW AMBULATORY MEDICATION CHARGE

## 2025-01-24 ENCOUNTER — APPOINTMENT (OUTPATIENT)
Dept: PHARMACY | Facility: HOSPITAL | Age: 50
End: 2025-01-24
Payer: COMMERCIAL

## 2025-01-24 DIAGNOSIS — E11.9 TYPE 2 DIABETES MELLITUS WITHOUT COMPLICATION, WITHOUT LONG-TERM CURRENT USE OF INSULIN (MULTI): Primary | ICD-10-CM

## 2025-01-24 NOTE — PROGRESS NOTES
[facility] (VBID)    Michael Mulligan is a 49 y.o. male presents to Clinical Pharmacy Team for follow up as part of the Value Based Insurance Design (VBID) diabetes program.  Pharmacy team may also provide assistance in diabetes management per discussion with referring provider and/or endocrinology. Referring Provider: Amna White DO    Does patient follow with Endocrinology: No    Subjective   Allergies   Allergen Reactions    Cat's Claw Anaphylaxis    Metformin Diarrhea, Headache and GI Upset     Patient reports severe diarrhea accompanied by GI upset and headache.     Lipofen [Fenofibrate] Other     Diabetes  He presents for his follow-up diabetic visit. He has type 2 diabetes mellitus. The initial diagnosis of diabetes was made 2 years ago. His disease course has been improving. There are no hypoglycemic complications. Risk factors for coronary artery disease include diabetes mellitus, obesity, male sex and hypertension.     Pertinent PMH Review:  PMH of Pancreatitis: No  PMH of Retinopathy: No - last eye exam, 3 years  PMH of Urinary Tract Infections: No  PMH of MTC: No    HOME MONITORING  Monitoring Device: OneTouch Verio Flex, Freestyle Augusto 2 CGM (paying out of pocket)  Monitoring Frequency: continuous, as needed  Any episodes of hypoglycemia? No, denies s/sx .      Current BG readings:   Overall, 120-130 mg/dL - spike after eating food but returns to normal quickly    Previous BG Readings:  AM - 110s mg/dL  Throughout day - 110-150 mg/dL  Highest witnessed after meal 238 mg/d  2 hr PPG - 130 mg/dL    BARRIERS TO ADHERENCE  Affordability/Accessibility:  insurance, enrolled in VBID program  Missed doses: none  Side effects: denies adverse effects, reports positive change in appetite suppression and general unhealthy food cravings    Objective     BP Readings from Last 6 Encounters:   01/08/25 136/64   09/25/24 138/65   09/09/24 128/64   04/23/24 132/56  "  11/03/23 156/72   12/14/22 136/68      Daily Weight  01/08/25 : 113 kg (248 lb 12.8 oz)  09/25/24 : 116 kg (255 lb 15.3 oz)  09/09/24 : 118 kg (259 lb 6.4 oz)  04/23/24 : 118 kg (259 lb 9.6 oz)  11/03/23 : 117 kg (257 lb 9 oz)  04/05/22 : 113 kg (249 lb 1 oz)     LAB  Lab Results   Component Value Date    BILITOT 0.6 09/06/2024    CALCIUM 9.3 09/06/2024    CO2 30 09/06/2024    CL 98 09/06/2024    CREATININE 0.93 09/06/2024    GLUCOSE 251 (H) 09/06/2024    ALKPHOS 60 09/06/2024    K 3.1 (L) 09/06/2024    PROT 7.2 09/06/2024     09/06/2024    AST 21 09/06/2024    ALT 31 09/06/2024    BUN 18 09/06/2024    ANIONGAP 13 09/06/2024    ALBUMIN 4.3 09/06/2024     Lab Results   Component Value Date    TRIG 253 (H) 09/06/2024    CHOL 126 09/06/2024    LDLCALC 42 09/06/2024    HDL 33.8 09/06/2024     Lab Results   Component Value Date    HGBA1C 6.6 (A) 01/08/2025     Estimated body mass index is 38.97 kg/m² as calculated from the following:    Height as of 1/8/25: 1.702 m (5' 7\").    Weight as of 1/8/25: 113 kg (248 lb 12.8 oz).     Current Outpatient Medications on File Prior to Visit   Medication Sig Dispense Refill    alcohol swabs pads, medicated Use one swab to cleanse skin and allow to dry prior to injecting medications or testing blood glucose. 200 each 11    amLODIPine (Norvasc) 10 mg tablet TAKE 1 TABLET BY MOUTH EVERY DAY 90 tablet 3    aspirin 81 mg EC tablet TAKE 1 TABLET Daily take 1 tablet daily with food      betamethasone dipropionate (Diprosone) 0.05 % ointment APPLY SPARINGLY TO AFFECTED AREA(S) ONCE DAILY      blood sugar diagnostic (OneTouch Verio test strips) strip Use new test strip with meter each time to test blood glucose up to four times daily as needed. 100 each 11    blood-glucose meter (OneTouch Verio Flex meter) misc Use as directed to test blood glucose up to four times daily as needed. 1 each 0    carvedilol (Coreg) 6.25 mg tablet TAKE 1 TABLET (6.25 MG) BY MOUTH TWICE A DAY WITH MEALS " 180 tablet 3    FreeStyle Augusto 2 [place] misc Use as instructed 1 each 0    FreeStyle Augusto 2 Sensor kit Apply to upper arm. Remove and replace every 14 days. 2 each 11    ibuprofen 200 mg tablet Take 3 tablets (600 mg) by mouth every 8 hours if needed for mild pain (1 - 3) or moderate pain (4 - 6).      lancets (OneTouch Delica Plus Lancet) 33 gauge misc Use new lancet each time with lancing device to prick finger and test blood glucose up to four times daily as needed. 100 each 11    losartan (Cozaar) 100 mg tablet TAKE 1 TABLET (100 MG) BY MOUTH ONCE DAILY. AS DIRECTED 90 tablet 3    rosuvastatin (Crestor) 40 mg tablet TAKE 1 TABLET BY MOUTH EVERY DAY 90 tablet 3    triamterene-hydrochlorothiazid (Maxzide) 75-50 mg tablet Take 1 tablet by mouth once daily. 90 tablet 1    [DISCONTINUED] semaglutide (OZEMPIC) 1 mg/dose (4 mg/3 mL) pen injector Inject 1 mg under the skin 1 (one) time per week. 3 mL 1     No current facility-administered medications on file prior to visit.      CURRENT DIABETES PHARMACOTHERAPY  Ozempic 1 mg injected subcutaneously once weekly    HISTORICAL PHARMACOTHERAPY (if relevant)  Metformin - one tab (500 mg) BID, stopped d/t side effects (headache, severe diarrhea)     PREVENTATIVE PHARMACOTHERAPY  Statin? yes, rosuvastatin 40 mg  LDL: at goal < 70 mg/dL  ACE-I/ARB? yes, losartan 100 mg   BP: improved, managed by cardio, < 130/80  UACR: normal (< 30 mg/g)  Aspirin?  yes    VBID Employee Diabetes Program Enrollment: Up to date  Patient enrolled in  Employee diabetes program for $0 co-pays on diabetes medications/supplies. Requested VBID enrollment date: 9/20/24  PharmD Management Level: Level 3-4   Pharmacy fill location: [facility]    Assessment/Plan   Problem List Items Addressed This Visit    None  Visit Diagnoses       Type 2 diabetes mellitus without complication, without long-term current use of insulin (Multi)    -  Primary    Relevant Medications    semaglutide  (OZEMPIC) 1 mg/dose (4 mg/3 mL) pen injector    Other Relevant Orders    Referral to Clinical Pharmacy          Diabetes:  Patient's Type 2 diabetes is well controlled with most recent A1c of 6.6% on 1/8/25 (Goal < 7%). Overall, great improvement on current treatment regimen. Pt reports side effects w/ Ozempic have abated and continuing to experience lower appetite and slower, but steady weight loss. SMBGs stable and indicate excellently controlled BG consistent w/ A1c. Plan to maintain on the 1 mg dose of Ozempic weekly, follow up in 2 months to reassess.  Plan:  Continue taking Ozempic 1 mg injected subcutaneously once weekly    Follow up: 3/14/25 @ 1:00      Michell Flowers, BrendaD     Continue all meds under the continuation of care with the referring provider and clinical pharmacy team. Patient/Caregiver was informed they may decline to participate or withdraw from participation in pharmacy services at any time.

## 2025-01-24 NOTE — PATIENT INSTRUCTIONS
Thank you for entrusting your care to the Clinical Pharmacy Team! We look forward to seeing you again soon.  Please call your clinical pharmacist with any questions or concerns.    You are enrolled in the [facility] Employee Value Based Insurance Design (VBID) program. This program allows you to receive for $0 co-pays on diabetes medications/supplies.  To maintain your eligibility for this program, you must:  Maintain  employee insurance coverage  Fill prescriptions at a  pharmacy for pick-up or home delivery  Complete a visit with a clinical pharmacist at least once annually    Michell Flowers, PharmD  P: [phone]    To schedule an appointment, please contact:  P: [phone]

## 2025-01-30 PROCEDURE — RXMED WILLOW AMBULATORY MEDICATION CHARGE

## 2025-02-04 ENCOUNTER — PHARMACY VISIT (OUTPATIENT)
Dept: PHARMACY | Facility: CLINIC | Age: 50
End: 2025-02-04
Payer: COMMERCIAL

## 2025-03-14 ENCOUNTER — APPOINTMENT (OUTPATIENT)
Dept: PHARMACY | Facility: HOSPITAL | Age: 50
End: 2025-03-14
Payer: COMMERCIAL

## 2025-03-14 DIAGNOSIS — E11.9 TYPE 2 DIABETES MELLITUS WITHOUT COMPLICATION, WITHOUT LONG-TERM CURRENT USE OF INSULIN (MULTI): Primary | ICD-10-CM

## 2025-03-14 NOTE — PROGRESS NOTES
[facility] (VBID)    Michael Mulligan is a 49 y.o. male presents to Clinical Pharmacy Team for follow up as part of the Value Based [employer] Design (VBID) diabetes program.  Pharmacy team may also provide assistance in diabetes management per discussion with referring provider and/or endocrinology. Referring Provider: Amna White DO    Does patient follow with Endocrinology: No    Subjective   Allergies   Allergen Reactions    Cat's Claw Anaphylaxis    Metformin Diarrhea, Headache and GI Upset     Patient reports severe diarrhea accompanied by GI upset and headache.     Lipofen [Fenofibrate] Other     Diabetes  He presents for his follow-up diabetic visit. He has type 2 diabetes mellitus. The initial diagnosis of diabetes was made 2 years ago. His disease course has been improving. There are no hypoglycemic complications. Risk factors for coronary artery disease include diabetes mellitus, obesity, male sex and hypertension.     Pertinent PMH Review:  PMH of Pancreatitis: No  PMH of Retinopathy: No - last eye exam, 3 years  PMH of Urinary Tract Infections: No  PMH of MTC: No    HOME MONITORING  Monitoring Device: OneTouch Verio Flex, Freestyle Augusto 2 CGM (paying out of pocket)  Monitoring Frequency: continuous, as needed  Any episodes of hypoglycemia? No, denies s/sx .      Current BG readings:   Reports no change at today's visit, SMBGs have been steady    Previous BG Readings:  01/24/25 - Overall, 120-130 mg/dL - spike after eating food but returns to normal quickly  12/20/24  AM - 110s mg/dL  Throughout day - 110-150 mg/dL  Highest witnessed after meal 238 mg/d  2 hr PPG - 130 mg/dL    BARRIERS TO ADHERENCE  Affordability/Accessibility:  insurance, enrolled in VBID program  Missed doses: none  Side effects: some nausea reported when increasing to 1 mg dose, reports has abated, continues to endorse positive change in appetite suppression and general unhealthy  "food cravings    Objective     BP Readings from Last 6 Encounters:   01/08/25 136/64   09/25/24 138/65   09/09/24 128/64   04/23/24 132/56   11/03/23 156/72   12/14/22 136/68      Daily Weight  01/08/25 : 113 kg (248 lb 12.8 oz)  09/25/24 : 116 kg (255 lb 15.3 oz)  09/09/24 : 118 kg (259 lb 6.4 oz)  04/23/24 : 118 kg (259 lb 9.6 oz)  11/03/23 : 117 kg (257 lb 9 oz)  04/05/22 : 113 kg (249 lb 1 oz)     LAB  Lab Results   Component Value Date    BILITOT 0.6 09/06/2024    CALCIUM 9.3 09/06/2024    CO2 30 09/06/2024    CL 98 09/06/2024    CREATININE 0.93 09/06/2024    GLUCOSE 251 (H) 09/06/2024    ALKPHOS 60 09/06/2024    K 3.1 (L) 09/06/2024    PROT 7.2 09/06/2024     09/06/2024    AST 21 09/06/2024    ALT 31 09/06/2024    BUN 18 09/06/2024    ANIONGAP 13 09/06/2024    ALBUMIN 4.3 09/06/2024     Lab Results   Component Value Date    TRIG 253 (H) 09/06/2024    CHOL 126 09/06/2024    LDLCALC 42 09/06/2024    HDL 33.8 09/06/2024     Lab Results   Component Value Date    HGBA1C 6.6 (A) 01/08/2025     Estimated body mass index is 38.97 kg/m² as calculated from the following:    Height as of 1/8/25: 1.702 m (5' 7\").    Weight as of 1/8/25: 113 kg (248 lb 12.8 oz).     Current Outpatient Medications on File Prior to Visit   Medication Sig Dispense Refill    alcohol swabs pads, medicated Use one swab to cleanse skin and allow to dry prior to injecting medications or testing blood glucose. 200 each 11    amLODIPine (Norvasc) 10 mg tablet TAKE 1 TABLET BY MOUTH EVERY DAY 90 tablet 3    aspirin 81 mg EC tablet TAKE 1 TABLET Daily take 1 tablet daily with food      betamethasone dipropionate (Diprosone) 0.05 % ointment APPLY SPARINGLY TO AFFECTED AREA(S) ONCE DAILY      blood sugar diagnostic (OneTouch Verio test strips) strip Use new test strip with meter each time to test blood glucose up to four times daily as needed. 100 each 11    blood-glucose meter (OneTouch Verio Flex meter) misc Use as directed to test blood glucose " up to four times daily as needed. 1 each 0    carvedilol (Coreg) 6.25 mg tablet TAKE 1 TABLET (6.25 MG) BY MOUTH TWICE A DAY WITH MEALS 180 tablet 3    FreeStyle Augusto 2 [place] misc Use as instructed 1 each 0    FreeStyle Augusto 2 Sensor kit Apply to upper arm. Remove and replace every 14 days. 2 each 11    ibuprofen 200 mg tablet Take 3 tablets (600 mg) by mouth every 8 hours if needed for mild pain (1 - 3) or moderate pain (4 - 6).      lancets (OneTouch Delica Plus Lancet) 33 gauge misc Use new lancet each time with lancing device to prick finger and test blood glucose up to four times daily as needed. 100 each 11    losartan (Cozaar) 100 mg tablet TAKE 1 TABLET (100 MG) BY MOUTH ONCE DAILY. AS DIRECTED 90 tablet 3    rosuvastatin (Crestor) 40 mg tablet TAKE 1 TABLET BY MOUTH EVERY DAY 90 tablet 3    triamterene-hydrochlorothiazid (Maxzide) 75-50 mg tablet Take 1 tablet by mouth once daily. 90 tablet 1    [DISCONTINUED] semaglutide (OZEMPIC) 1 mg/dose (4 mg/3 mL) pen injector Inject 1 mg under the skin 1 (one) time per week. 3 mL 3     No current facility-administered medications on file prior to visit.      CURRENT DIABETES PHARMACOTHERAPY  Ozempic 1 mg injected subcutaneously once weekly    HISTORICAL PHARMACOTHERAPY (if relevant)  Metformin - one tab (500 mg) BID, stopped d/t side effects (headache, severe diarrhea)     PREVENTATIVE PHARMACOTHERAPY  Statin? yes, rosuvastatin 40 mg  LDL: at goal < 70 mg/dL  ACE-I/ARB? yes, losartan 100 mg   BP: improved, managed by cardio, < 130/80  UACR: normal (< 30 mg/g)  Aspirin?  yes    VBID Employee Diabetes Program Enrollment: Up to date  Patient enrolled in  Employee diabetes program for $0 co-pays on diabetes medications/supplies. Requested VBID enrollment date: 9/20/24  PharmD Management Level: Level 3-4   Pharmacy fill location: [facility]    Assessment/Plan   Problem List Items Addressed This Visit    None  Visit Diagnoses       Type 2 diabetes  mellitus without complication, without long-term current use of insulin (Multi)    -  Primary    Relevant Medications    semaglutide 2 mg/dose (8 mg/3 mL) pen injector    Other Relevant Orders    Referral to Clinical Pharmacy          Diabetes:  Patient's Type 2 diabetes is well controlled with most recent A1c of 6.6% on 1/8/25 (Goal < 7%). Overall, doing very well on current tx regimen. SMBGs stable and indicate excellently controlled, BG consistent w/ A1c. While glycemic control stable, pt would benefit from increased dose to target weight loss and decrease comorbidity burden. Plan to increase to Ozempic w/ goal of 2 mg once weekly dose, pt may utilize count clicking method to administer lower dose of 1.5 mg if needed d/t side effects.  Plan:  Increase to Ozempic 2 mg injected subcutaneously once weekly - may admin 1.5 mg dose if needed d/t side effects    Follow up: 4/18/25 @ 1:00      Michell Flowers, PharmD     Continue all meds under the continuation of care with the referring provider and clinical pharmacy team. Patient/Caregiver was informed they may decline to participate or withdraw from participation in pharmacy services at any time.

## 2025-03-18 PROCEDURE — RXMED WILLOW AMBULATORY MEDICATION CHARGE

## 2025-03-20 ENCOUNTER — PHARMACY VISIT (OUTPATIENT)
Dept: PHARMACY | Facility: CLINIC | Age: 50
End: 2025-03-20
Payer: COMMERCIAL

## 2025-03-24 DIAGNOSIS — M54.16 RIGHT LUMBAR RADICULITIS: ICD-10-CM

## 2025-03-24 DIAGNOSIS — M51.369 DDD (DEGENERATIVE DISC DISEASE), LUMBAR: ICD-10-CM

## 2025-03-24 PROCEDURE — RXMED WILLOW AMBULATORY MEDICATION CHARGE

## 2025-03-24 RX ORDER — CYCLOBENZAPRINE HCL 5 MG
5 TABLET ORAL 2 TIMES DAILY PRN
Qty: 60 TABLET | Refills: 0 | Status: SHIPPED | OUTPATIENT
Start: 2025-03-24 | End: 2025-04-25

## 2025-03-26 ENCOUNTER — PHARMACY VISIT (OUTPATIENT)
Dept: PHARMACY | Facility: CLINIC | Age: 50
End: 2025-03-26
Payer: COMMERCIAL

## 2025-03-31 ENCOUNTER — APPOINTMENT (OUTPATIENT)
Dept: CARDIOLOGY | Facility: CLINIC | Age: 50
End: 2025-03-31
Payer: COMMERCIAL

## 2025-03-31 DIAGNOSIS — I35.0 AORTIC VALVE STENOSIS, ETIOLOGY OF CARDIAC VALVE DISEASE UNSPECIFIED: Primary | ICD-10-CM

## 2025-04-01 PROCEDURE — RXMED WILLOW AMBULATORY MEDICATION CHARGE

## 2025-04-02 ENCOUNTER — PHARMACY VISIT (OUTPATIENT)
Dept: PHARMACY | Facility: CLINIC | Age: 50
End: 2025-04-02
Payer: COMMERCIAL

## 2025-04-11 PROCEDURE — RXMED WILLOW AMBULATORY MEDICATION CHARGE

## 2025-04-17 ENCOUNTER — PHARMACY VISIT (OUTPATIENT)
Dept: PHARMACY | Facility: CLINIC | Age: 50
End: 2025-04-17
Payer: COMMERCIAL

## 2025-04-18 ENCOUNTER — APPOINTMENT (OUTPATIENT)
Dept: PHARMACY | Facility: HOSPITAL | Age: 50
End: 2025-04-18
Payer: COMMERCIAL

## 2025-04-18 DIAGNOSIS — E11.9 TYPE 2 DIABETES MELLITUS WITHOUT COMPLICATION, WITHOUT LONG-TERM CURRENT USE OF INSULIN: ICD-10-CM

## 2025-04-18 NOTE — PATIENT INSTRUCTIONS
Continue taking Ozempic 2 mg once weekly as discussed. As you're doing so well we will push our next follow up out to 6 months. Please don't hesitate to reach out to me with any questions or concerns in the meantime!    Michell Flowers, PharmD  [phone]

## 2025-04-18 NOTE — PROGRESS NOTES
[facility] (VBID)    Michael Mulligan is a 49 y.o. male presents to Clinical Pharmacy Team for follow up as part of the Value Based Insurance Design (VBID) diabetes program.  Pharmacy team may also provide assistance in diabetes management per discussion with referring provider and/or endocrinology. Referring Provider: Amna White DO    Does patient follow with Endocrinology: No    Subjective   Allergies   Allergen Reactions    Cat's Claw Anaphylaxis    Metformin Diarrhea, Headache and GI Upset     Patient reports severe diarrhea accompanied by GI upset and headache.     Lipofen [Fenofibrate] Other     Diabetes  He presents for his follow-up diabetic visit. He has type 2 diabetes mellitus. The initial diagnosis of diabetes was made 2 years ago. His disease course has been improving. There are no hypoglycemic complications. Risk factors for coronary artery disease include diabetes mellitus, obesity, male sex and hypertension.     Pertinent PMH Review:  PMH of Pancreatitis: No  PMH of Retinopathy: No - last eye exam, 3 years  PMH of Urinary Tract Infections: No  PMH of MTC: No    HOME MONITORING  Monitoring Device: OneTouch Verio Flex, Freestyle Augusto 2 CGM (paying out of pocket)  Monitoring Frequency: continuous, as needed  Any episodes of hypoglycemia? No, denies s/sx .      Current BG readings:   Reports no change at today's visit, SMBGs have been steady - low, staying on average in range, denies hypoglycemia    Previous BG Readings:  03/14/25 - reports steady SMBGs, at goal  01/24/25 - Overall, 120-130 mg/dL - spike after eating food but returns to normal quickly  12/20/24  AM - 110s mg/dL  Throughout day - 110-150 mg/dL  Highest witnessed after meal 238 mg/d  2 hr PPG - 130 mg/dL    BARRIERS TO ADHERENCE  Affordability/Accessibility:  insurance, enrolled in VBID program  Missed doses: none  Side effects: denies    Objective     BP Readings from Last 6 Encounters:  "  01/08/25 136/64   09/25/24 138/65   09/09/24 128/64   04/23/24 132/56   11/03/23 156/72   12/14/22 136/68      Daily Weight  01/08/25 : 113 kg (248 lb 12.8 oz)  09/25/24 : 116 kg (255 lb 15.3 oz)  09/09/24 : 118 kg (259 lb 6.4 oz)  04/23/24 : 118 kg (259 lb 9.6 oz)  11/03/23 : 117 kg (257 lb 9 oz)  04/05/22 : 113 kg (249 lb 1 oz)     LAB  Lab Results   Component Value Date    BILITOT 0.6 09/06/2024    CALCIUM 9.3 09/06/2024    CO2 30 09/06/2024    CL 98 09/06/2024    CREATININE 0.93 09/06/2024    GLUCOSE 251 (H) 09/06/2024    ALKPHOS 60 09/06/2024    K 3.1 (L) 09/06/2024    PROT 7.2 09/06/2024     09/06/2024    AST 21 09/06/2024    ALT 31 09/06/2024    BUN 18 09/06/2024    ANIONGAP 13 09/06/2024    ALBUMIN 4.3 09/06/2024     Lab Results   Component Value Date    TRIG 253 (H) 09/06/2024    CHOL 126 09/06/2024    LDLCALC 42 09/06/2024    HDL 33.8 09/06/2024     Lab Results   Component Value Date    HGBA1C 6.6 (A) 01/08/2025     Estimated body mass index is 38.97 kg/m² as calculated from the following:    Height as of 1/8/25: 1.702 m (5' 7\").    Weight as of 1/8/25: 113 kg (248 lb 12.8 oz).     Current Outpatient Medications on File Prior to Visit   Medication Sig Dispense Refill    alcohol swabs pads, medicated Use one swab to cleanse skin and allow to dry prior to injecting medications or testing blood glucose. 200 each 11    amLODIPine (Norvasc) 10 mg tablet TAKE 1 TABLET BY MOUTH EVERY DAY 90 tablet 3    aspirin 81 mg EC tablet TAKE 1 TABLET Daily take 1 tablet daily with food      betamethasone dipropionate (Diprosone) 0.05 % ointment APPLY SPARINGLY TO AFFECTED AREA(S) ONCE DAILY      blood sugar diagnostic (OneTouch Verio test strips) strip Use new test strip with meter each time to test blood glucose up to four times daily as needed. 100 each 11    blood-glucose meter (OneTouch Verio Flex meter) misc Use as directed to test blood glucose up to four times daily as needed. 1 each 0    carvedilol (Coreg) " 6.25 mg tablet TAKE 1 TABLET (6.25 MG) BY MOUTH TWICE A DAY WITH MEALS 180 tablet 3    cyclobenzaprine (Flexeril) 5 mg tablet Take 1 tablet (5 mg) by mouth 2 times a day as needed for muscle spasms. 60 tablet 0    FreeStyle Augusto 2 [place] misc Use as instructed 1 each 0    FreeStyle Augusto 2 Sensor kit Apply to upper arm. Remove and replace every 14 days. 2 each 11    ibuprofen 200 mg tablet Take 3 tablets (600 mg) by mouth every 8 hours if needed for mild pain (1 - 3) or moderate pain (4 - 6).      lancets (OneTouch Delica Plus Lancet) 33 gauge misc Use new lancet each time with lancing device to prick finger and test blood glucose up to four times daily as needed. 100 each 11    losartan (Cozaar) 100 mg tablet TAKE 1 TABLET (100 MG) BY MOUTH ONCE DAILY. AS DIRECTED 90 tablet 3    rosuvastatin (Crestor) 40 mg tablet TAKE 1 TABLET BY MOUTH EVERY DAY 90 tablet 3    semaglutide 2 mg/dose (8 mg/3 mL) pen injector Inject 2 mg under the skin 1 (one) time per week. 9 mL 3    triamterene-hydrochlorothiazid (Maxzide) 75-50 mg tablet Take 1 tablet by mouth once daily. 90 tablet 1     No current facility-administered medications on file prior to visit.      CURRENT DIABETES PHARMACOTHERAPY  Ozempic 2 mg injected subcutaneously once weekly    HISTORICAL PHARMACOTHERAPY (if relevant)  Metformin - one tab (500 mg) BID, stopped d/t side effects (headache, severe diarrhea)     PREVENTATIVE PHARMACOTHERAPY  Statin? yes, rosuvastatin 40 mg  LDL: at goal < 70 mg/dL  ACE-I/ARB? yes, losartan 100 mg   BP: improved, managed by cardio, < 130/80  UACR: normal (< 30 mg/g)  Aspirin?  yes    VBID Employee Diabetes Program Enrollment: Up to date  Patient enrolled in  Employee diabetes program for $0 co-pays on diabetes medications/supplies. Requested VBID enrollment date: 9/20/24  PharmD Management Level: Level 3-4   Pharmacy fill location: [facility]    Assessment/Plan   Problem List Items Addressed This Visit     None  Visit Diagnoses         Type 2 diabetes mellitus without complication, without long-term current use of insulin              Diabetes:  Patient's Type 2 diabetes is well controlled with most recent A1c of 6.6% on 1/8/25 (Goal < 7%). Overall, doing very well on current tx regimen, denies side effects. SMBGs remain stable and denies any hypo/hyperglycemia.  Continue:  Ozempic 2 mg injected subcutaneously once weekly     Follow up: 6 months      Michell Flowers, PharmD     Continue all meds under the continuation of care with the referring provider and clinical pharmacy team. Patient/Caregiver was informed they may decline to participate or withdraw from participation in pharmacy services at any time.

## 2025-04-18 NOTE — Clinical Note
DM/VBID follow up - doing great! On Ozempic 2 mg once weekly, SMBGs at goal. Next visit w/ clinical pharmacy is in 6 months.

## 2025-05-01 ENCOUNTER — OFFICE VISIT (OUTPATIENT)
Dept: CARDIOLOGY | Facility: CLINIC | Age: 50
End: 2025-05-01
Payer: COMMERCIAL

## 2025-05-01 VITALS
SYSTOLIC BLOOD PRESSURE: 132 MMHG | DIASTOLIC BLOOD PRESSURE: 76 MMHG | BODY MASS INDEX: 37.9 KG/M2 | WEIGHT: 242 LBS | OXYGEN SATURATION: 98 % | HEART RATE: 78 BPM

## 2025-05-01 DIAGNOSIS — Q23.1 AORTIC REGURGITATION DUE TO BICUSPID AORTIC VALVE: ICD-10-CM

## 2025-05-01 DIAGNOSIS — I71.21 ANEURYSM OF ASCENDING AORTA WITHOUT RUPTURE: ICD-10-CM

## 2025-05-01 DIAGNOSIS — Q23.81 AORTIC REGURGITATION DUE TO BICUSPID AORTIC VALVE: ICD-10-CM

## 2025-05-01 DIAGNOSIS — I35.0 AORTIC STENOSIS, MILD: ICD-10-CM

## 2025-05-01 DIAGNOSIS — I10 HYPERTENSION, UNSPECIFIED TYPE: Primary | ICD-10-CM

## 2025-05-01 PROCEDURE — 3078F DIAST BP <80 MM HG: CPT | Performed by: INTERNAL MEDICINE

## 2025-05-01 PROCEDURE — RXMED WILLOW AMBULATORY MEDICATION CHARGE

## 2025-05-01 PROCEDURE — 93010 ELECTROCARDIOGRAM REPORT: CPT | Performed by: INTERNAL MEDICINE

## 2025-05-01 PROCEDURE — 93005 ELECTROCARDIOGRAM TRACING: CPT | Performed by: INTERNAL MEDICINE

## 2025-05-01 PROCEDURE — 3075F SYST BP GE 130 - 139MM HG: CPT | Performed by: INTERNAL MEDICINE

## 2025-05-01 PROCEDURE — 1036F TOBACCO NON-USER: CPT | Performed by: INTERNAL MEDICINE

## 2025-05-01 PROCEDURE — 99214 OFFICE O/P EST MOD 30 MIN: CPT | Performed by: INTERNAL MEDICINE

## 2025-05-01 PROCEDURE — 99214 OFFICE O/P EST MOD 30 MIN: CPT | Mod: 25 | Performed by: INTERNAL MEDICINE

## 2025-05-01 RX ORDER — SPIRONOLACTONE 25 MG/1
25 TABLET ORAL DAILY
Qty: 90 TABLET | Refills: 1 | Status: SHIPPED | OUTPATIENT
Start: 2025-05-01 | End: 2026-05-01

## 2025-05-01 ASSESSMENT — ENCOUNTER SYMPTOMS
NAUSEA: 0
FEVER: 0
HEMATURIA: 0
DYSURIA: 0
VOMITING: 0
ALTERED MENTAL STATUS: 0
MYALGIAS: 0
CONSTIPATION: 0
CHILLS: 0
MEMORY LOSS: 0
ABDOMINAL PAIN: 0
WHEEZING: 0
BLOATING: 0
DIARRHEA: 0
HEADACHES: 0
COUGH: 0
HEMOPTYSIS: 0
DEPRESSION: 0
FALLS: 0

## 2025-05-01 NOTE — PROGRESS NOTES
Chief complaint:  Follow-up (1 yr.)     HPI  48 yo WM w/ h/o pericardial effusion (during viral pericarditis) s/p window 2/16, ?bicuspid AV w/ mild AS and mod AI, AsAo aneurysm, CAC, HTN, HLD, DM now here for cardiology FU. No chest pain. No dyspnea. No palps. No LH/dizziness/syncope. No edema. No claudication. No cough.   Weather dependent, he walks 30 minutes 3d/wk with no symptoms.  BP at home: 130s-140s/70s-80s  ECG 7/16: SR (74), 1st deg AVB, ?AMI, lat ST-T changes  ECG 1/19: SR (63), 1st deg AVB, ?AMI, lat ST-T changes  ECG 4/22: SR (66), 1st deg AVB, ?AMI, lat ST-T changes  ECG 5/25: SR (73), 1st deg AVB, ?AMI, lat ST-T changes  Echo 2/15: EF 55%, AoR 4.5cm, mod dil AsAo, mild-mod AI, large PE (near RA/RV collapse)  Echo 2/16: nl LV/RV, mod-large PE (mod RA collapse)  Echo 10/16: EF 65%, mod LVH, DD, nl RV, ?bicuspid AV, mild AS, mod AI, small PE  Echo 5/22: TDS, EF 65%, DD, AoR 4.7cm, AsAo 4.6cm, mild AVS, mod AI, trivial PE  Echo 11/23: TDS, EF 65%, mod-sev cLVH, AoR 4.6cm, AsAo 4.8cm, mild AS, mild AI, tr-sm PE  CAC 4/22: 490 (LM 0, , Lcx 31, RCA 28), CM, small-mod PE, AsAo 4.8cm  CT chest 4/16: CM, peric eff, nl Ao, no aneurysm, nl PA  CT chest 4/17: mild CM mod peric eff (stable), AsAo 4.5cm, nl PA  CT ab 2/16: CM, large PE, no aneurysm     Review of Systems   Constitutional: Negative for chills, fever and malaise/fatigue.   HENT:  Negative for hearing loss.    Eyes:  Negative for visual disturbance.   Respiratory:  Negative for cough, hemoptysis and wheezing.    Skin:  Negative for rash.   Musculoskeletal:  Negative for falls and myalgias.   Gastrointestinal:  Negative for bloating, abdominal pain, constipation, diarrhea, dysphagia, nausea and vomiting.   Genitourinary:  Negative for dysuria and hematuria.   Neurological:  Negative for headaches.   Psychiatric/Behavioral:  Negative for altered mental status, depression and memory loss.       Social History     Tobacco Use    Smoking status: Never     Smokeless tobacco: Never   Substance Use Topics    Alcohol use: Not Currently      Family History   Problem Relation Name Age of Onset    Breast cancer Mother Jose Mulligan     Heart attack Mother Jose Mulligan     Heart attack Father Alvarado Mulligan     Alcohol abuse Maternal Grandfather Darion Brownlagregoriaana       Allergies   Allergen Reactions    Cat's Claw Anaphylaxis    Metformin Diarrhea, Headache and GI Upset     Patient reports severe diarrhea accompanied by GI upset and headache.     Lipofen [Fenofibrate] Other      Current Outpatient Medications   Medication Instructions    alcohol swabs pads, medicated Use one swab to cleanse skin and allow to dry prior to injecting medications or testing blood glucose.    amLODIPine (Norvasc) 10 mg tablet TAKE 1 TABLET BY MOUTH EVERY DAY    aspirin 81 mg EC tablet TAKE 1 TABLET Daily take 1 tablet daily with food    betamethasone dipropionate (Diprosone) 0.05 % ointment APPLY SPARINGLY TO AFFECTED AREA(S) ONCE DAILY    blood sugar diagnostic (OneTouch Verio test strips) strip Use new test strip with meter each time to test blood glucose up to four times daily as needed.    blood-glucose meter (OneTouch Verio Flex meter) misc Use as directed to test blood glucose up to four times daily as needed.    carvedilol (Coreg) 6.25 mg tablet TAKE 1 TABLET (6.25 MG) BY MOUTH TWICE A DAY WITH MEALS    FreeStyle Augusto 2 [place] misc Use as instructed    FreeStyle Augusto 2 Sensor kit Apply to upper arm. Remove and replace every 14 days.    ibuprofen 200 mg tablet Take 3 tablets (600 mg) by mouth every 8 hours if needed for mild pain (1 - 3) or moderate pain (4 - 6).    lancets (OneTouch Delica Plus Lancet) 33 gauge misc Use new lancet each time with lancing device to prick finger and test blood glucose up to four times daily as needed.    losartan (COZAAR) 100 mg, oral, Daily, as directed    Ozempic 2 mg, subcutaneous, Weekly    rosuvastatin (CRESTOR) 40 mg, oral, Daily     triamterene-hydrochlorothiazid (Maxzide) 75-50 mg tablet 1 tablet, oral, Daily      /76   Pulse 78   Wt 110 kg (242 lb)   SpO2 98%   BMI 37.90 kg/m²       Physical Exam  Constitutional:       Appearance: Normal appearance.   HENT:      Head: Normocephalic and atraumatic.      Nose: Nose normal.   Cardiovascular:      Rate and Rhythm: Normal rate and regular rhythm.      Heart sounds: Murmur heard.      Systolic murmur is present with a grade of 1/6.      Diastolic murmur is present with a grade of 1/4.   Pulmonary:      Effort: Pulmonary effort is normal.      Breath sounds: Normal breath sounds.   Abdominal:      Palpations: Abdomen is soft.      Tenderness: There is no abdominal tenderness.   Musculoskeletal:      Right lower leg: Edema present.      Left lower leg: Edema present.      Comments: Tr LE edema   Skin:     General: Skin is warm and dry.   Neurological:      General: No focal deficit present.      Mental Status: He is alert.   Psychiatric:         Mood and Affect: Mood normal.         Judgment: Judgment normal.       Labs  9/24 Cr 0.93, K 3.1, LFT nl, LDL 42, HDL 34, , Chol 126, HGB 14.8, , TSH 1.63  11/21 Cr 0.88, K 3.0, LFT nl, LDL 54, HDL 32, , Chol 121, HGB 15.8, , hgba1c 7.7, TSH 2.03     Assessment/Plan   48 yo WM w/ h/o pericardial effusion (during viral pericarditis) s/p window 2/16, ?bicuspid AV w/ mild AS and mod AI, AsAo aneurysm, CAC, HTN, HLD, DM. Doing well. No sig cardiac symptoms. Due to AsAo aneurysm (and BAV/AVS/AI), check limited echo (likely will need annual or CT).   BP needs better control (SBP may be higher with AI) - goal BP <120/70 with aneurysm.  -continue Carvedilol 6.25 bid  -continue Losartan 100 every day     -continue Amlodipine 10 every day  -continue Triam-HCTZ 75/50 every day   -add Jef 25 every day for HTN and low K  -continue Rosvua 40 every day  -f/u 1 year (earlier if needed)    Joe Bailey MD

## 2025-05-02 ENCOUNTER — PHARMACY VISIT (OUTPATIENT)
Dept: PHARMACY | Facility: CLINIC | Age: 50
End: 2025-05-02
Payer: COMMERCIAL

## 2025-05-03 LAB
ATRIAL RATE: 73 BPM
P AXIS: 35 DEGREES
P OFFSET: 151 MS
P ONSET: 80 MS
PR INTERVAL: 270 MS
Q ONSET: 215 MS
QRS COUNT: 12 BEATS
QRS DURATION: 110 MS
QT INTERVAL: 420 MS
QTC CALCULATION(BAZETT): 462 MS
QTC FREDERICIA: 448 MS
R AXIS: 32 DEGREES
T AXIS: 140 DEGREES
T OFFSET: 425 MS
VENTRICULAR RATE: 73 BPM

## 2025-05-08 DIAGNOSIS — I10 HYPERTENSION, UNSPECIFIED TYPE: ICD-10-CM

## 2025-05-08 PROCEDURE — RXMED WILLOW AMBULATORY MEDICATION CHARGE

## 2025-05-08 RX ORDER — TRIAMTERENE AND HYDROCHLOROTHIAZIDE 75; 50 MG/1; MG/1
1 TABLET ORAL DAILY
Qty: 90 TABLET | Refills: 3 | Status: SHIPPED | OUTPATIENT
Start: 2025-05-08 | End: 2026-05-03

## 2025-05-12 ENCOUNTER — PHARMACY VISIT (OUTPATIENT)
Dept: PHARMACY | Facility: CLINIC | Age: 50
End: 2025-05-12
Payer: COMMERCIAL

## 2025-05-20 ENCOUNTER — PATIENT MESSAGE (OUTPATIENT)
Dept: CARE COORDINATION | Facility: CLINIC | Age: 50
End: 2025-05-20
Payer: COMMERCIAL

## 2025-06-06 ENCOUNTER — HOSPITAL ENCOUNTER (OUTPATIENT)
Dept: CARDIOLOGY | Facility: CLINIC | Age: 50
Discharge: HOME | End: 2025-06-06
Payer: COMMERCIAL

## 2025-06-06 LAB
AORTIC VALVE MEAN GRADIENT: 18 MMHG
AORTIC VALVE PEAK VELOCITY: 2.82 M/S
AV PEAK GRADIENT: 32 MMHG
AVA (PEAK VEL): 2.15 CM2
AVA (VTI): 2.18 CM2
EJECTION FRACTION APICAL 4 CHAMBER: 54.7
EJECTION FRACTION: 65 %
LEFT VENTRICLE INTERNAL DIMENSION DIASTOLE: 5.57 CM (ref 3.5–6)
LEFT VENTRICULAR OUTFLOW TRACT DIAMETER: 2.55 CM

## 2025-06-06 PROCEDURE — 93308 TTE F-UP OR LMTD: CPT | Performed by: INTERNAL MEDICINE

## 2025-06-06 PROCEDURE — 93308 TTE F-UP OR LMTD: CPT

## 2025-06-25 PROCEDURE — RXMED WILLOW AMBULATORY MEDICATION CHARGE

## 2025-07-01 ENCOUNTER — PHARMACY VISIT (OUTPATIENT)
Dept: PHARMACY | Facility: CLINIC | Age: 50
End: 2025-07-01
Payer: COMMERCIAL

## 2025-07-08 ENCOUNTER — APPOINTMENT (OUTPATIENT)
Dept: PRIMARY CARE | Facility: CLINIC | Age: 50
End: 2025-07-08
Payer: COMMERCIAL

## 2025-07-08 VITALS
WEIGHT: 243.6 LBS | HEART RATE: 80 BPM | DIASTOLIC BLOOD PRESSURE: 56 MMHG | BODY MASS INDEX: 38.24 KG/M2 | OXYGEN SATURATION: 95 % | HEIGHT: 67 IN | SYSTOLIC BLOOD PRESSURE: 122 MMHG

## 2025-07-08 DIAGNOSIS — E78.00 HYPERCHOLESTEROLEMIA: ICD-10-CM

## 2025-07-08 DIAGNOSIS — Z12.11 COLON CANCER SCREENING: ICD-10-CM

## 2025-07-08 DIAGNOSIS — E11.9 TYPE 2 DIABETES MELLITUS WITHOUT COMPLICATION, WITHOUT LONG-TERM CURRENT USE OF INSULIN: Primary | ICD-10-CM

## 2025-07-08 DIAGNOSIS — I10 HYPERTENSION, UNSPECIFIED TYPE: ICD-10-CM

## 2025-07-08 LAB — POC HEMOGLOBIN A1C: 5.8 % (ref 4.2–6.5)

## 2025-07-08 PROCEDURE — 3044F HG A1C LEVEL LT 7.0%: CPT | Performed by: STUDENT IN AN ORGANIZED HEALTH CARE EDUCATION/TRAINING PROGRAM

## 2025-07-08 PROCEDURE — 3074F SYST BP LT 130 MM HG: CPT | Performed by: STUDENT IN AN ORGANIZED HEALTH CARE EDUCATION/TRAINING PROGRAM

## 2025-07-08 PROCEDURE — 4010F ACE/ARB THERAPY RXD/TAKEN: CPT | Performed by: STUDENT IN AN ORGANIZED HEALTH CARE EDUCATION/TRAINING PROGRAM

## 2025-07-08 PROCEDURE — 3008F BODY MASS INDEX DOCD: CPT | Performed by: STUDENT IN AN ORGANIZED HEALTH CARE EDUCATION/TRAINING PROGRAM

## 2025-07-08 PROCEDURE — 99214 OFFICE O/P EST MOD 30 MIN: CPT | Performed by: STUDENT IN AN ORGANIZED HEALTH CARE EDUCATION/TRAINING PROGRAM

## 2025-07-08 PROCEDURE — 1036F TOBACCO NON-USER: CPT | Performed by: STUDENT IN AN ORGANIZED HEALTH CARE EDUCATION/TRAINING PROGRAM

## 2025-07-08 PROCEDURE — 83036 HEMOGLOBIN GLYCOSYLATED A1C: CPT | Performed by: STUDENT IN AN ORGANIZED HEALTH CARE EDUCATION/TRAINING PROGRAM

## 2025-07-08 PROCEDURE — 99396 PREV VISIT EST AGE 40-64: CPT | Performed by: STUDENT IN AN ORGANIZED HEALTH CARE EDUCATION/TRAINING PROGRAM

## 2025-07-08 PROCEDURE — 3078F DIAST BP <80 MM HG: CPT | Performed by: STUDENT IN AN ORGANIZED HEALTH CARE EDUCATION/TRAINING PROGRAM

## 2025-07-08 ASSESSMENT — ENCOUNTER SYMPTOMS
NAUSEA: 0
FEVER: 0
BACK PAIN: 0
DIFFICULTY URINATING: 0
PALPITATIONS: 0
HEADACHES: 0
SLEEP DISTURBANCE: 0
NUMBNESS: 0
ABDOMINAL PAIN: 0
SINUS PAIN: 0
SHORTNESS OF BREATH: 0
DIZZINESS: 0
DIARRHEA: 0
WEAKNESS: 0
COUGH: 0
CONSTIPATION: 0
SORE THROAT: 0
BRUISES/BLEEDS EASILY: 0
SINUS PRESSURE: 0
FATIGUE: 0
FREQUENCY: 0
NERVOUS/ANXIOUS: 0
ROS SKIN COMMENTS: NO SKIN LESIONS CHANGING COLOR, SHAPE OR SIZE

## 2025-07-08 ASSESSMENT — PROMIS GLOBAL HEALTH SCALE
CARRYOUT_SOCIAL_ACTIVITIES: GOOD
RATE_PHYSICAL_HEALTH: GOOD
RATE_GENERAL_HEALTH: GOOD
RATE_QUALITY_OF_LIFE: GOOD
RATE_AVERAGE_FATIGUE: MILD
RATE_AVERAGE_PAIN: 1
RATE_SOCIAL_SATISFACTION: GOOD
EMOTIONAL_PROBLEMS: NEVER
RATE_MENTAL_HEALTH: GOOD
CARRYOUT_PHYSICAL_ACTIVITIES: MOSTLY

## 2025-07-08 ASSESSMENT — PATIENT HEALTH QUESTIONNAIRE - PHQ9
SUM OF ALL RESPONSES TO PHQ9 QUESTIONS 1 AND 2: 0
2. FEELING DOWN, DEPRESSED OR HOPELESS: NOT AT ALL
1. LITTLE INTEREST OR PLEASURE IN DOING THINGS: NOT AT ALL

## 2025-07-08 NOTE — PROGRESS NOTES
History Of Present Illness  Michael Mulligan is a 49 y.o. male presents for cpe.      DM  Following with the clinical pharmacy   No current side effects   Down ~10lbs   Up to Ozempic 2mg weekly     Past Medical History  He has a past medical history of Allergic reaction (11/02/2023), Aneurysm, Congenital heart disease, Diabetes mellitus (Multi), Generalized anxiety disorder, Hypertension, Low back pain, Pericardial effusion ([facility]) (11/02/2023), Personal history of other mental and behavioral disorders, and Pure hypercholesterolemia, unspecified (12/14/2022).    Surgical History  He has a past surgical history that includes Other surgical history (03/30/2016) and Epidural block injection.     Social History  He reports that he has never smoked. He has never used smokeless tobacco. He reports that he does not currently use alcohol. He reports that he does not use drugs.    Family History  Family History[1]     Allergies  Cat's claw, Metformin, and Lipofen [fenofibrate]    Review of Systems   Constitutional:  Negative for fatigue and fever.   HENT:  Negative for dental problem, sinus pressure, sinus pain and sore throat.         No changes in hearing   Eyes:  Negative for visual disturbance.        No vision changes    Respiratory:  Negative for cough and shortness of breath.    Cardiovascular:  Negative for chest pain, palpitations and leg swelling.   Gastrointestinal:  Negative for abdominal pain, constipation, diarrhea and nausea.   Genitourinary:  Negative for difficulty urinating, frequency and urgency.   Musculoskeletal:  Negative for back pain.   Skin:  Negative for rash.        No skin lesions changing color, shape or size   Neurological:  Negative for dizziness, weakness, numbness and headaches.   Hematological:  Does not bruise/bleed easily.   Psychiatric/Behavioral:  Negative for sleep disturbance. The patient is not nervous/anxious.    All other systems reviewed and are negative.       Physical  Exam  Vitals reviewed.   Constitutional:       General: He is not in acute distress.     Appearance: He is not ill-appearing.   HENT:      Right Ear: Tympanic membrane and ear canal normal.      Left Ear: Tympanic membrane and ear canal normal.      Mouth/Throat:      Mouth: Mucous membranes are moist.      Pharynx: Oropharynx is clear. No oropharyngeal exudate or posterior oropharyngeal erythema.   Eyes:      Extraocular Movements: Extraocular movements intact.      Conjunctiva/sclera: Conjunctivae normal.      Pupils: Pupils are equal, round, and reactive to light.   Neck:      Vascular: No carotid bruit.   Cardiovascular:      Rate and Rhythm: Normal rate and regular rhythm.      Heart sounds: No murmur heard.     No gallop.   Pulmonary:      Effort: Pulmonary effort is normal.      Breath sounds: Normal breath sounds. No wheezing, rhonchi or rales.   Abdominal:      General: Abdomen is flat. Bowel sounds are normal.      Palpations: Abdomen is soft.      Tenderness: There is no abdominal tenderness.   Musculoskeletal:      Cervical back: Neck supple.      Left lower leg: No edema.   Skin:     General: Skin is warm and dry.      Findings: No rash.   Neurological:      General: No focal deficit present.      Mental Status: He is alert and oriented to person, place, and time.      Gait: Gait normal.   Psychiatric:         Mood and Affect: Mood normal.         Behavior: Behavior normal.        Last Recorded Vitals  /56   Pulse 80   Wt 110 kg (243 lb 9.6 oz)   SpO2 95%     Relevant Results  Current Medications[2]      Assessment/Plan   Diagnoses and all orders for this visit:  Type 2 diabetes mellitus without complication, without long-term current use of insulin  -     POCT glycosylated hemoglobin (Hb A1C) manually resulted  -     Comprehensive Metabolic Panel; Future  -     Lipid Panel; Future  -     TSH with reflex to Free T4 if abnormal; Future  -     CBC and Auto Differential; Future  -      Albumin-Creatinine Ratio, Urine Random; Future  Hypertension, unspecified type  -     POCT glycosylated hemoglobin (Hb A1C) manually resulted  -     Comprehensive Metabolic Panel; Future  -     Lipid Panel; Future  -     TSH with reflex to Free T4 if abnormal; Future  -     CBC and Auto Differential; Future  -     Albumin-Creatinine Ratio, Urine Random; Future  Hypercholesterolemia  -     POCT glycosylated hemoglobin (Hb A1C) manually resulted  -     Comprehensive Metabolic Panel; Future  -     Lipid Panel; Future  -     TSH with reflex to Free T4 if abnormal; Future  -     CBC and Auto Differential; Future  -     Albumin-Creatinine Ratio, Urine Random; Future  Colon cancer screening  -     Cologuard® colon cancer screening; Future    DM  A1c currently 5.8%<--6.6% 1/8/25  Goal of <7%  Comorbid conditions: HTN, Obesity, DSL  Continue Ozempic 1 mg weekly   Working with clinical pharmacy  Statin: yes, rosuvastatin 40mg daily    ACE/ARB: yes    Albumin:Cr completed 2023  Weight loss of 11lbs     HTN  136/64mmHg  Continue losartan 100mg daily   Continue carvedilol 6.25mg daily   Continue amlodipine 10mg daily   Continue trimterene-hydrochlorothiazide 75-50mg daily   -spirnolactone was added at 25mg daily   Physical exam was stable. Discussed maintaining diets such as DASH to help maintain normal Blood pressure. Encouraged regular exercise for a total of 120 min weekly. We will fu labs in 1-3 days. For now there will be no change in medical management. All questions and concerns were addressed. Pt will follow up in 4-6 months or sooner if needed.      DSL  Continue rosuvastatin 40mg daily   Lipid Panel ordered      Heart Murmur   Aortic regurgitation due to bicuspid valve  Cardiology following      Anxiety   Improved and now off of medication  Buspar 7.5mg BID      Thoracic aorta Aneurysm  Stable   Aneurysmal dilatation ascending thoracic aorta up to 4.8 cm  similar to prior.  Cardiology following     Health Maintenance    Topic Date Due   • HIV Screening  Never done   • Colorectal Cancer Screening  Never done   • Diabetes: Retinopathy Screening  Never done   • Hepatitis C Screening  Never done   • Hepatitis B Vaccines (1 of 3 - 19+ 3-dose series) Never done   • Pneumococcal Vaccine (1 of 2 - PCV) 08/28/1993   • DTaP/Tdap/Td Vaccines (1 - Tdap) Never done   • MMR Vaccines (1 of 1 - Standard series) Never done   • Zoster Vaccines (1 of 2) Never done   • COVID-19 Vaccine (4 - 2024-25 season) 09/01/2024   • Lipid Panel  09/06/2025   • Influenza Vaccine (1) 09/01/2025   • Diabetes: Urine Protein Screening  09/06/2025   • Yearly Adult Physical  09/10/2025   • Diabetes: Hemoglobin A1C  10/08/2025   • HPV Vaccines (No Doses Required) Completed   • HIB Vaccines  Aged Out   • IPV Vaccines  Aged Out   • Hepatitis A Vaccines  Aged Out   • Meningococcal Vaccine  Aged Out   • Rotavirus Vaccines  Aged Out               Amna White DO             [1]  Family History  Problem Relation Name Age of Onset   • Breast cancer Mother Jose Mulligan    • Heart attack Mother Jose Mulligan    • Heart attack Father Alvarado Mulligan    • Alcohol abuse Maternal Grandfather Darion Mello    [2]    Current Outpatient Medications:   •  alcohol swabs pads, medicated, Use one swab to cleanse skin and allow to dry prior to injecting medications or testing blood glucose., Disp: 200 each, Rfl: 11  •  amLODIPine (Norvasc) 10 mg tablet, TAKE 1 TABLET BY MOUTH EVERY DAY, Disp: 90 tablet, Rfl: 3  •  aspirin 81 mg EC tablet, TAKE 1 TABLET Daily take 1 tablet daily with food, Disp: , Rfl:   •  betamethasone dipropionate (Diprosone) 0.05 % ointment, APPLY SPARINGLY TO AFFECTED AREA(S) ONCE DAILY, Disp: , Rfl:   •  carvedilol (Coreg) 6.25 mg tablet, TAKE 1 TABLET (6.25 MG) BY MOUTH TWICE A DAY WITH MEALS, Disp: 180 tablet, Rfl: 3  •  FreeStyle Augusto 2 [place] misc, Use as instructed, Disp: 1 each, Rfl: 0  •  FreeStyle Augusto 2 Sensor kit, Apply to upper arm. Remove and replace  every 14 days., Disp: 2 each, Rfl: 11  •  ibuprofen 200 mg tablet, Take 3 tablets (600 mg) by mouth every 8 hours if needed for mild pain (1 - 3) or moderate pain (4 - 6)., Disp: , Rfl:   •  losartan (Cozaar) 100 mg tablet, TAKE 1 TABLET (100 MG) BY MOUTH ONCE DAILY. AS DIRECTED, Disp: 90 tablet, Rfl: 3  •  rosuvastatin (Crestor) 40 mg tablet, TAKE 1 TABLET BY MOUTH EVERY DAY, Disp: 90 tablet, Rfl: 3  •  semaglutide 2 mg/dose (8 mg/3 mL) pen injector, Inject 2 mg under the skin 1 (one) time per week., Disp: 9 mL, Rfl: 3  •  spironolactone (Aldactone) 25 mg tablet, Take 1 tablet (25 mg) by mouth once daily., Disp: 90 tablet, Rfl: 1  •  triamterene-hydrochlorothiazid (Maxzide) 75-50 mg tablet, Take 1 tablet by mouth once daily., Disp: 90 tablet, Rfl: 3

## 2025-07-22 PROCEDURE — RXMED WILLOW AMBULATORY MEDICATION CHARGE

## 2025-07-25 ENCOUNTER — PHARMACY VISIT (OUTPATIENT)
Dept: PHARMACY | Facility: CLINIC | Age: 50
End: 2025-07-25
Payer: COMMERCIAL

## 2025-07-25 PROCEDURE — RXMED WILLOW AMBULATORY MEDICATION CHARGE

## 2025-08-05 ENCOUNTER — PHARMACY VISIT (OUTPATIENT)
Dept: PHARMACY | Facility: CLINIC | Age: 50
End: 2025-08-05
Payer: COMMERCIAL

## 2025-08-05 PROCEDURE — RXMED WILLOW AMBULATORY MEDICATION CHARGE

## 2026-01-08 ENCOUNTER — APPOINTMENT (OUTPATIENT)
Dept: PRIMARY CARE | Facility: CLINIC | Age: 51
End: 2026-01-08
Payer: COMMERCIAL